# Patient Record
Sex: FEMALE | Race: WHITE | NOT HISPANIC OR LATINO
[De-identification: names, ages, dates, MRNs, and addresses within clinical notes are randomized per-mention and may not be internally consistent; named-entity substitution may affect disease eponyms.]

---

## 2020-09-28 PROBLEM — Z00.00 ENCOUNTER FOR PREVENTIVE HEALTH EXAMINATION: Status: ACTIVE | Noted: 2020-09-28

## 2020-10-02 ENCOUNTER — APPOINTMENT (OUTPATIENT)
Dept: PLASTIC SURGERY | Facility: CLINIC | Age: 70
End: 2020-10-02
Payer: MEDICARE

## 2020-10-02 VITALS — BODY MASS INDEX: 23.92 KG/M2 | WEIGHT: 130 LBS | HEIGHT: 62 IN

## 2020-10-02 DIAGNOSIS — Z78.9 OTHER SPECIFIED HEALTH STATUS: ICD-10-CM

## 2020-10-02 PROCEDURE — 99203 OFFICE O/P NEW LOW 30 MIN: CPT

## 2020-10-02 RX ORDER — FISH OIL/E/FATTY ACID5/HERB137 400 MG-5
CAPSULE ORAL
Refills: 0 | Status: ACTIVE | COMMUNITY

## 2020-10-02 RX ORDER — MENTHOL/CAMPHOR 0.5 %-0.5%
1000 LOTION (ML) TOPICAL
Refills: 0 | Status: ACTIVE | COMMUNITY

## 2020-10-02 RX ORDER — MULTIVIT-MIN/IRON/FOLIC ACID/K 18-600-40
CAPSULE ORAL
Refills: 0 | Status: ACTIVE | COMMUNITY

## 2020-10-02 NOTE — ASSESSMENT
[FreeTextEntry1] : 71 y/o F with newly dx Malignant Melanoma of scalp, at least 1.8mm\par \par as above\par no neck LAD appreciated\par well healing right posterior scalp bx site\par needs:\par -PET scan\par -neck CT w IV contrast\par -excision w STSG and concurrent sentinel lymph node biopsy\par \par Regarding the procedure proposed to excise the melanoma, we discussed the following risks:  Patient is aware of possible upstaging pending the final pathology report, as well as the need for additional surgery and/or other therapeutic interventions.  We discussed scarring, risk of repeat procedure, poor wound healing, bleeding, infection, and dissatisfaction with the outcome.  We also discussed the possible role of adjuvant therapy and other specialists being involved in the care of the patient.  Dermatologic surveillance was emphasized.\par All questions were answered to the patient's satisfaction.\par \par Regarding sentinel lymph node biopsy, we discussed my recommendation to perform the procedure and rationale for it.  Patient is aware the risk of the procedure include possible neurovascular injury with temporary or permanent deficit, lymphatic injury resulting in postoperative seroma, lymphocoele or recurrent fluid collections that may require intervention, additional or formal lymph node dissection depending upon the pathology results, as well as bleeding and infection.  Patient understands the additional incisions that will be required for the procedure.  Patient understands the risks and had their questions answered.\par \par F/U after imaging studies

## 2020-10-02 NOTE — PHYSICAL EXAM
[de-identified] : well-appearing, NAD [de-identified] : right superior scalp with 6x6mm healing biopsy site\par  [de-identified] : no palpable LAD [de-identified] : no palpable LAD

## 2020-10-02 NOTE — HISTORY OF PRESENT ILLNESS
[FreeTextEntry1] : Pt is a 71 y/o F with PMH of multiple BCC who presents for evaluation of newly dx malignant melanoma to scalp, 1.8mm. Pt states it was found on routine dermatological exam. No other testing done yet.\par \par Nonsmoker,\par Nondiabetic\par Retired\par Lives alone

## 2020-10-09 ENCOUNTER — RESULT REVIEW (OUTPATIENT)
Age: 70
End: 2020-10-09

## 2020-10-09 ENCOUNTER — OUTPATIENT (OUTPATIENT)
Dept: OUTPATIENT SERVICES | Facility: HOSPITAL | Age: 70
LOS: 1 days | Discharge: HOME | End: 2020-10-09
Payer: MEDICARE

## 2020-10-09 DIAGNOSIS — C43.4 MALIGNANT MELANOMA OF SCALP AND NECK: ICD-10-CM

## 2020-10-09 LAB — GLUCOSE BLDC GLUCOMTR-MCNC: 93 MG/DL — SIGNIFICANT CHANGE UP (ref 70–99)

## 2020-10-09 PROCEDURE — 78816 PET IMAGE W/CT FULL BODY: CPT | Mod: 26,PI

## 2020-10-13 ENCOUNTER — OUTPATIENT (OUTPATIENT)
Dept: OUTPATIENT SERVICES | Facility: HOSPITAL | Age: 70
LOS: 1 days | Discharge: HOME | End: 2020-10-13
Payer: MEDICARE

## 2020-10-13 ENCOUNTER — RESULT REVIEW (OUTPATIENT)
Age: 70
End: 2020-10-13

## 2020-10-13 VITALS
RESPIRATION RATE: 15 BRPM | SYSTOLIC BLOOD PRESSURE: 143 MMHG | WEIGHT: 133.16 LBS | HEART RATE: 82 BPM | HEIGHT: 62 IN | DIASTOLIC BLOOD PRESSURE: 60 MMHG | TEMPERATURE: 98 F | OXYGEN SATURATION: 98 %

## 2020-10-13 DIAGNOSIS — C43.4 MALIGNANT MELANOMA OF SCALP AND NECK: ICD-10-CM

## 2020-10-13 DIAGNOSIS — Z98.890 OTHER SPECIFIED POSTPROCEDURAL STATES: Chronic | ICD-10-CM

## 2020-10-13 DIAGNOSIS — Z01.818 ENCOUNTER FOR OTHER PREPROCEDURAL EXAMINATION: ICD-10-CM

## 2020-10-13 LAB
ALBUMIN SERPL ELPH-MCNC: 4.6 G/DL — SIGNIFICANT CHANGE UP (ref 3.5–5.2)
ALP SERPL-CCNC: 58 U/L — SIGNIFICANT CHANGE UP (ref 30–115)
ALT FLD-CCNC: 16 U/L — SIGNIFICANT CHANGE UP (ref 0–41)
ANION GAP SERPL CALC-SCNC: 12 MMOL/L — SIGNIFICANT CHANGE UP (ref 7–14)
APTT BLD: 30 SEC — SIGNIFICANT CHANGE UP (ref 27–39.2)
AST SERPL-CCNC: 18 U/L — SIGNIFICANT CHANGE UP (ref 0–41)
BASOPHILS # BLD AUTO: 0.06 K/UL — SIGNIFICANT CHANGE UP (ref 0–0.2)
BASOPHILS NFR BLD AUTO: 0.8 % — SIGNIFICANT CHANGE UP (ref 0–1)
BILIRUB SERPL-MCNC: 0.4 MG/DL — SIGNIFICANT CHANGE UP (ref 0.2–1.2)
BUN SERPL-MCNC: 19 MG/DL — SIGNIFICANT CHANGE UP (ref 10–20)
CALCIUM SERPL-MCNC: 9.5 MG/DL — SIGNIFICANT CHANGE UP (ref 8.5–10.1)
CHLORIDE SERPL-SCNC: 103 MMOL/L — SIGNIFICANT CHANGE UP (ref 98–110)
CO2 SERPL-SCNC: 24 MMOL/L — SIGNIFICANT CHANGE UP (ref 17–32)
CREAT SERPL-MCNC: 0.7 MG/DL — SIGNIFICANT CHANGE UP (ref 0.7–1.5)
EOSINOPHIL # BLD AUTO: 0.33 K/UL — SIGNIFICANT CHANGE UP (ref 0–0.7)
EOSINOPHIL NFR BLD AUTO: 4.2 % — SIGNIFICANT CHANGE UP (ref 0–8)
GLUCOSE SERPL-MCNC: 102 MG/DL — HIGH (ref 70–99)
HCT VFR BLD CALC: 40.4 % — SIGNIFICANT CHANGE UP (ref 37–47)
HGB BLD-MCNC: 13.2 G/DL — SIGNIFICANT CHANGE UP (ref 12–16)
IMM GRANULOCYTES NFR BLD AUTO: 0.1 % — SIGNIFICANT CHANGE UP (ref 0.1–0.3)
INR BLD: 0.9 RATIO — SIGNIFICANT CHANGE UP (ref 0.65–1.3)
LYMPHOCYTES # BLD AUTO: 1.96 K/UL — SIGNIFICANT CHANGE UP (ref 1.2–3.4)
LYMPHOCYTES # BLD AUTO: 25.2 % — SIGNIFICANT CHANGE UP (ref 20.5–51.1)
MCHC RBC-ENTMCNC: 32.7 G/DL — SIGNIFICANT CHANGE UP (ref 32–37)
MCHC RBC-ENTMCNC: 32.7 PG — HIGH (ref 27–31)
MCV RBC AUTO: 100 FL — HIGH (ref 81–99)
MONOCYTES # BLD AUTO: 0.59 K/UL — SIGNIFICANT CHANGE UP (ref 0.1–0.6)
MONOCYTES NFR BLD AUTO: 7.6 % — SIGNIFICANT CHANGE UP (ref 1.7–9.3)
NEUTROPHILS # BLD AUTO: 4.82 K/UL — SIGNIFICANT CHANGE UP (ref 1.4–6.5)
NEUTROPHILS NFR BLD AUTO: 62.1 % — SIGNIFICANT CHANGE UP (ref 42.2–75.2)
NRBC # BLD: 0 /100 WBCS — SIGNIFICANT CHANGE UP (ref 0–0)
PLATELET # BLD AUTO: 219 K/UL — SIGNIFICANT CHANGE UP (ref 130–400)
POTASSIUM SERPL-MCNC: 4.1 MMOL/L — SIGNIFICANT CHANGE UP (ref 3.5–5)
POTASSIUM SERPL-SCNC: 4.1 MMOL/L — SIGNIFICANT CHANGE UP (ref 3.5–5)
PROT SERPL-MCNC: 6.9 G/DL — SIGNIFICANT CHANGE UP (ref 6–8)
PROTHROM AB SERPL-ACNC: 10.4 SEC — SIGNIFICANT CHANGE UP (ref 9.95–12.87)
RBC # BLD: 4.04 M/UL — LOW (ref 4.2–5.4)
RBC # FLD: 11.9 % — SIGNIFICANT CHANGE UP (ref 11.5–14.5)
SODIUM SERPL-SCNC: 139 MMOL/L — SIGNIFICANT CHANGE UP (ref 135–146)
WBC # BLD: 7.77 K/UL — SIGNIFICANT CHANGE UP (ref 4.8–10.8)
WBC # FLD AUTO: 7.77 K/UL — SIGNIFICANT CHANGE UP (ref 4.8–10.8)

## 2020-10-13 PROCEDURE — 93010 ELECTROCARDIOGRAM REPORT: CPT | Mod: NC

## 2020-10-13 PROCEDURE — 71046 X-RAY EXAM CHEST 2 VIEWS: CPT | Mod: 26

## 2020-10-13 NOTE — H&P PST ADULT - RS GEN PE MLT RESP DETAILS PC
airway patent/no chest wall tenderness/breath sounds equal/good air movement/normal/respirations non-labored/clear to auscultation bilaterally

## 2020-10-13 NOTE — H&P PST ADULT - REASON FOR ADMISSION
PT PRESENTS TO PAST WITH NO SOB, CP, PALPITATIONS, DYSURIA, UTI OR URI AT PRESENT.   PT ABLE TO WALK UP 2-3 FLIGHTS OF STEPS WITH NO SOB.  AS PER THE PT, THIS IS HIS/HER COMPLETE MEDICAL AND SURGICAL HX, INCLUDING MEDICATIONS PRESCRIBED AND OVER THE COUNTER  pt denies any covid s/s, or tested positive in the past  pt advised self quarantine till day of procedure  denies travel outside the [place] in the past 30 days  Anesthesia Alert  NO--Difficult Airway  NO--History of neck surgery or radiation  NO--Limited ROM of neck  NO--History of Malignant hyperthermia  NO--Personal or family history of Pseudocholinesterase deficiency  NO--Prior Anesthesia Complication  NO--Latex Allergy  NO--Loose teeth  NO--History of Rheumatoid Arthritis  NO--ODALIS  NO--Other_____  PT SCHEDULED FOR EXCISION OF SCALP MELANOMA SENTINEL LYMPH NODE BIOPSY SPLIT THICKNESS SKIN GRAFT TO SCALP DONOR SITE BACK OR B/L THIGH.

## 2020-10-15 PROBLEM — C44.90 UNSPECIFIED MALIGNANT NEOPLASM OF SKIN, UNSPECIFIED: Chronic | Status: ACTIVE | Noted: 2020-10-13

## 2020-10-17 ENCOUNTER — RESULT REVIEW (OUTPATIENT)
Age: 70
End: 2020-10-17

## 2020-10-17 ENCOUNTER — OUTPATIENT (OUTPATIENT)
Dept: OUTPATIENT SERVICES | Facility: HOSPITAL | Age: 70
LOS: 1 days | Discharge: HOME | End: 2020-10-17
Payer: MEDICARE

## 2020-10-17 DIAGNOSIS — C43.4 MALIGNANT MELANOMA OF SCALP AND NECK: ICD-10-CM

## 2020-10-17 DIAGNOSIS — Z98.890 OTHER SPECIFIED POSTPROCEDURAL STATES: Chronic | ICD-10-CM

## 2020-10-17 PROCEDURE — 70491 CT SOFT TISSUE NECK W/DYE: CPT | Mod: 26

## 2020-10-18 ENCOUNTER — LABORATORY RESULT (OUTPATIENT)
Age: 70
End: 2020-10-18

## 2020-10-18 ENCOUNTER — OUTPATIENT (OUTPATIENT)
Dept: OUTPATIENT SERVICES | Facility: HOSPITAL | Age: 70
LOS: 1 days | Discharge: HOME | End: 2020-10-18

## 2020-10-18 DIAGNOSIS — Z98.890 OTHER SPECIFIED POSTPROCEDURAL STATES: Chronic | ICD-10-CM

## 2020-10-19 DIAGNOSIS — Z11.59 ENCOUNTER FOR SCREENING FOR OTHER VIRAL DISEASES: ICD-10-CM

## 2020-10-21 ENCOUNTER — RESULT REVIEW (OUTPATIENT)
Age: 70
End: 2020-10-21

## 2020-10-21 ENCOUNTER — OUTPATIENT (OUTPATIENT)
Dept: OUTPATIENT SERVICES | Facility: HOSPITAL | Age: 70
LOS: 1 days | Discharge: HOME | End: 2020-10-21
Payer: MEDICARE

## 2020-10-21 ENCOUNTER — APPOINTMENT (OUTPATIENT)
Dept: PLASTIC SURGERY | Facility: AMBULATORY SURGERY CENTER | Age: 70
End: 2020-10-21
Payer: MEDICARE

## 2020-10-21 VITALS
OXYGEN SATURATION: 100 % | HEART RATE: 72 BPM | TEMPERATURE: 98 F | DIASTOLIC BLOOD PRESSURE: 61 MMHG | SYSTOLIC BLOOD PRESSURE: 119 MMHG | HEIGHT: 62 IN | WEIGHT: 133.16 LBS | RESPIRATION RATE: 17 BRPM

## 2020-10-21 VITALS — SYSTOLIC BLOOD PRESSURE: 134 MMHG | HEART RATE: 90 BPM | OXYGEN SATURATION: 97 % | DIASTOLIC BLOOD PRESSURE: 60 MMHG

## 2020-10-21 DIAGNOSIS — C43.4 MALIGNANT MELANOMA OF SCALP AND NECK: ICD-10-CM

## 2020-10-21 DIAGNOSIS — Z98.890 OTHER SPECIFIED POSTPROCEDURAL STATES: Chronic | ICD-10-CM

## 2020-10-21 PROCEDURE — 38510 BIOPSY/REMOVAL LYMPH NODES: CPT

## 2020-10-21 PROCEDURE — 88305 TISSUE EXAM BY PATHOLOGIST: CPT | Mod: 26

## 2020-10-21 PROCEDURE — 88307 TISSUE EXAM BY PATHOLOGIST: CPT | Mod: 26

## 2020-10-21 PROCEDURE — 88342 IMHCHEM/IMCYTCHM 1ST ANTB: CPT | Mod: 26

## 2020-10-21 PROCEDURE — 11626 EXC S/N/H/F/G MAL+MRG >4 CM: CPT | Mod: 59

## 2020-10-21 PROCEDURE — 78195 LYMPH SYSTEM IMAGING: CPT | Mod: 26

## 2020-10-21 PROCEDURE — 15120 SPLT AGRFT F/S/N/H/F/G/M 1ST: CPT

## 2020-10-21 PROCEDURE — 88341 IMHCHEM/IMCYTCHM EA ADD ANTB: CPT | Mod: 26

## 2020-10-21 PROCEDURE — 88313 SPECIAL STAINS GROUP 2: CPT | Mod: 26

## 2020-10-21 PROCEDURE — 15004 WOUND PREP F/N/HF/G: CPT

## 2020-10-21 RX ORDER — MORPHINE SULFATE 50 MG/1
2 CAPSULE, EXTENDED RELEASE ORAL
Refills: 0 | Status: DISCONTINUED | OUTPATIENT
Start: 2020-10-21 | End: 2020-10-21

## 2020-10-21 RX ORDER — TRAMADOL HYDROCHLORIDE 50 MG/1
1 TABLET ORAL
Qty: 10 | Refills: 0
Start: 2020-10-21

## 2020-10-21 RX ORDER — OXYCODONE AND ACETAMINOPHEN 5; 325 MG/1; MG/1
1 TABLET ORAL EVERY 4 HOURS
Refills: 0 | Status: DISCONTINUED | OUTPATIENT
Start: 2020-10-21 | End: 2020-10-21

## 2020-10-21 RX ORDER — CEPHALEXIN 500 MG
1 CAPSULE ORAL
Qty: 20 | Refills: 0
Start: 2020-10-21 | End: 2020-10-25

## 2020-10-21 RX ORDER — ONDANSETRON 8 MG/1
4 TABLET, FILM COATED ORAL ONCE
Refills: 0 | Status: DISCONTINUED | OUTPATIENT
Start: 2020-10-21 | End: 2020-11-04

## 2020-10-21 RX ORDER — SODIUM CHLORIDE 9 MG/ML
1000 INJECTION, SOLUTION INTRAVENOUS
Refills: 0 | Status: DISCONTINUED | OUTPATIENT
Start: 2020-10-21 | End: 2020-11-04

## 2020-10-21 RX ADMIN — SODIUM CHLORIDE 100 MILLILITER(S): 9 INJECTION, SOLUTION INTRAVENOUS at 16:06

## 2020-10-21 NOTE — BRIEF OPERATIVE NOTE - OPERATION/FINDINGS
Wide local excision of right scalp >/= 1.8mm malignant melanoma  Application of STSG to right scalp from right thigh  [place] LN biopsy of right neck, readin

## 2020-10-21 NOTE — BRIEF OPERATIVE NOTE - NSICDXBRIEFPROCEDURE_GEN_ALL_CORE_FT
PROCEDURES:  Excision, melanoma, head, with sentinel lymph node biopsy 21-Oct-2020 16:01:26 right scalp melanoma, right neck SLNBx, with STSG to right scalp from right thigh Rosangela Dunaway

## 2020-10-21 NOTE — ASU DISCHARGE PLAN (ADULT/PEDIATRIC) - ASU DC SPECIAL INSTRUCTIONSFT
-Keep dressing clean and dry. Do not remove.  -Take antibiotics for 5 days as prescribed.  -Take Tylenol as needed for pain. If not well controlled, take Tramadol as needed.  -Sleep with the head of the bed elevated using pillows to help prevent swelling.  -If any drainge from right thigh dressing, please reinfoce with more gauze. Do not remove.  -No driving if taking pain medication.  -Call the office anytime for questions or concerns: [phone].  -Follow up in 1 week.

## 2020-10-21 NOTE — CHART NOTE - NSCHARTNOTEFT_GEN_A_CORE
PACU ANESTHESIA ADMISSION NOTE      Procedure: Excision, melanoma, head, with sentinel lymph node biopsy  right scalp melanoma, right neck SLNBx, with STSG to right scalp from right thigh      Post op diagnosis:  Melanoma of scalp        ____  Intubated  TV:______       Rate: ______      FiO2: ______    __x__  Patent Airway    __x__  Full return of protective reflexes    __x__  Full recovery from anesthesia / back to baseline status    Vitals  HR: 89  BP: 153/77  RR: 18  O2 Sat: 99  Temp: 97    Mental Status:  __x__ Awake   ___x__ Alert   _____ Drowsy   _____ Sedated    Nausea/Vomiting:  __x__ NO  ______Yes,   See Post - Op Orders          Pain Scale (0-10):  _____    Treatment: ____ None    __x__ See Post - Op/PCA Orders    Post - Operative Fluids:   ____ Oral   __x__ See Post - Op Orders    Plan: Discharge when criteria met:   _x___Home       _____Floor     _____Critical Care   Other:_________________    Comments: Patient had smooth intraoperative event, no anesthesia complication.

## 2020-10-21 NOTE — ASU DISCHARGE PLAN (ADULT/PEDIATRIC) - CARE PROVIDER_API CALL
Aidan Reynolds  PLASTIC SURGERY  [address] [place] [address], [address]  [place] [address]  Phone: [phone]  Fax: [phone]  Follow Up Time:

## 2020-10-23 ENCOUNTER — NON-APPOINTMENT (OUTPATIENT)
Age: 70
End: 2020-10-23

## 2020-10-28 ENCOUNTER — APPOINTMENT (OUTPATIENT)
Dept: PLASTIC SURGERY | Facility: CLINIC | Age: 70
End: 2020-10-28
Payer: MEDICARE

## 2020-10-28 ENCOUNTER — LABORATORY RESULT (OUTPATIENT)
Age: 70
End: 2020-10-28

## 2020-10-28 PROCEDURE — 99024 POSTOP FOLLOW-UP VISIT: CPT

## 2020-10-28 NOTE — HISTORY OF PRESENT ILLNESS
[FreeTextEntry1] : Pt is a 69 y/o F with PMH of multiple BCC who presents for evaluation of newly dx malignant melanoma to scalp, 1.8mm. Pt states it was found on routine dermatological exam. No other testing done yet.\par \par Nonsmoker,\par Nondiabetic\par Retired\par Lives alone\par \par Interval hx (10/28/20): Pt presents today POD #7 s/p WLE of right scalp melanoma with SLNx and coverage with STSG from right thigh. Denies pain, taking only Tylenol PRN. Completed PO abx. Denies f/c.

## 2020-10-28 NOTE — ASSESSMENT
[FreeTextEntry1] : 71 y/o F with newly dx Malignant Melanoma of scalp, at least 1.8mm\par Now POD #7 s/p WLE of right scalp melanoma with SLNx and coverage with STSG from right thigh, doing well with 100% take\par \par -Pathology pending\par -Dressings changed\par -Keep all surgical sites clean and dry\par -F/u 1 week for staple removal

## 2020-10-28 NOTE — PHYSICAL EXAM
[de-identified] : well-appearing, NAD [de-identified] : right superior scalp with healing skin graft, completely adherent, staples c/d/i, no s/s cellulitis [de-identified] : no palpable LAD [de-identified] : right neck dressing c/d/i, nontender, no palpable seroma/hematoma [de-identified] : right thigh donor site healing apppropriately, minimal SS drainage

## 2020-11-01 ENCOUNTER — TRANSCRIPTION ENCOUNTER (OUTPATIENT)
Age: 70
End: 2020-11-01

## 2020-11-04 ENCOUNTER — APPOINTMENT (OUTPATIENT)
Dept: PLASTIC SURGERY | Facility: CLINIC | Age: 70
End: 2020-11-04
Payer: MEDICARE

## 2020-11-04 LAB — SURGICAL PATHOLOGY STUDY: SIGNIFICANT CHANGE UP

## 2020-11-04 PROCEDURE — 99024 POSTOP FOLLOW-UP VISIT: CPT

## 2020-11-04 NOTE — PHYSICAL EXAM
[de-identified] : well-appearing, NAD [de-identified] : right superior scalp with healing skin graft, completely adherent and well incorporated with 100% take, staples c/d/i, no s/s cellulitis or fluid collection  [de-identified] : no palpable LAD [de-identified] : right posterior neck incision c/d/i, nontender, no palpable seroma/hematoma [de-identified] : right thigh donor site stable and epithelializing, no active drainage

## 2020-11-04 NOTE — ASSESSMENT
[FreeTextEntry1] : 69 y/o F with newly dx Malignant Melanoma of scalp, at least 1.8mm.\par Now POD #14 s/p WLE of right scalp melanoma with SLN bx and coverage with STSG from right thigh, doing well.\par \par -Staples removed \par -Daily Aquaphor\par -Pathology pending\par -Post-op instructions discussed and all questions answered\par -May shower \par -F/u 2 weeks for wound check and pathology

## 2020-11-04 NOTE — HISTORY OF PRESENT ILLNESS
[FreeTextEntry1] : 71 y/o F with PMH of multiple BCC who presents for evaluation of newly dx malignant melanoma to scalp, 1.8mm. Pt states it was found on routine dermatological exam. No other testing done yet.\par \par Nonsmoker,\par Nondiabetic\par Retired\par Lives alone\par \par Interval hx (10/28/20): Pt presents today POD #7 s/p WLE of right scalp melanoma with SLNx and coverage with STSG from right thigh. Denies pain, taking only Tylenol PRN. Completed PO abx. Denies f/c.\par \par Interval hx (11/4/20). Patient presents today POD #14 s/p WLE of right scalp melanoma with SLN bx and coverage with STSG from right thigh. Doing well with resolving discomfort. Denies any fever, chills or bleeding.

## 2020-11-09 ENCOUNTER — NON-APPOINTMENT (OUTPATIENT)
Age: 70
End: 2020-11-09

## 2020-11-17 ENCOUNTER — APPOINTMENT (OUTPATIENT)
Dept: PLASTIC SURGERY | Facility: CLINIC | Age: 70
End: 2020-11-17
Payer: MEDICARE

## 2020-11-17 PROCEDURE — 99024 POSTOP FOLLOW-UP VISIT: CPT

## 2020-11-17 NOTE — PHYSICAL EXAM
[de-identified] : well-appearing, NAD [de-identified] : right superior scalp with maturing skin graft completely adherent and well incorporated with 100% take, periwound clean  [de-identified] : no palpable LAD [de-identified] : right posterior neck incision c/d/i, nontender, no palpable seroma/hematoma [de-identified] : right thigh donor site stable and epithelializing

## 2020-11-17 NOTE — ASSESSMENT
[FreeTextEntry1] : 69 y/o F with newly dx Malignant Melanoma of scalp, at least 1.8mm.\par Now 4 weeks s/p WLE of right scalp melanoma with SLN bx and coverage with STSG from right thigh, doing well.\par \par -Daily Aquaphor\par -Pathology discussed \par -Post-op instructions discussed and all questions answered\par -Dermatologic surveillance\par -F/U 4 months  \par \par as above\par doing very well\par no issues--path clear margins and LN negative\par \par f/u 4months

## 2020-11-17 NOTE — HISTORY OF PRESENT ILLNESS
[FreeTextEntry1] : 69 y/o F with PMH of multiple BCC who presents for evaluation of newly dx malignant melanoma to scalp, 1.8mm. Pt states it was found on routine dermatological exam. No other testing done yet.\par \par Nonsmoker,\par Nondiabetic\par Retired\par Lives alone\par \par Interval hx (10/28/20): Pt presents today POD #7 s/p WLE of right scalp melanoma with SLNx and coverage with STSG from right thigh. Denies pain, taking only Tylenol PRN. Completed PO abx. Denies f/c.\par \par Interval hx (11/4/20). Patient presents today POD #14 s/p WLE of right scalp melanoma with SLN bx and coverage with STSG from right thigh. Doing well with resolving discomfort. Denies any fever, chills or bleeding. \par \par Interval hx (11/17/20).  Patient presents today 4 weeks s/p WLE of right scalp melanoma with SLN bx and coverage with STSG from right thigh. Doing well with no significant complaints.

## 2020-11-17 NOTE — DATA REVIEWED
[FreeTextEntry1] :  Pathology             Final\par \par No Documents Attached\par \par \par \par   [facility] Accession Number : [id number]\par \par LILO PHILLIPS                           3\par \par \par \par Surgical Final Report\par \par \par \par \par Final Diagnosis\par 1. Right scalp melanoma, excision:\par - In-situ and invasive melanoma, completely excised.\par - Invasive melanoma thickness is 0.48 mm (Breslow).\par - Closest resection margin for in-situ melanoma is 1.6 cm (6:00\par margin).\par - Closest resection margin for invasive melanoma is 1.8 cm from\par the margin (6:00 margin).\par - Chronic inflammation, solar elastosis and focal fibrosis.\par - Immunohistochemical stain MART1/melan A highlights\par melanocytes.\par \par 2. Right neck submandibular lymph node (sentinel lymph node):\par - One lymph node negative for metastasis.\par - Immunohistochemical stain MART-1 / Melan A is negative.\par note: In the original biopsy report the depth of invasion is 1.8\par mm.\par \par Verified by: Arleth Quesada M.D.\par (Electronic Signature)\par Reported on: 11/04/20 17:28 EST, [facility] [address]\par Phone: [phone]   Fax: [phone]\par _________________________________________________________________\par \par Comment\par Case reported to Tumor Registry.\par Note: This case was reviewed in intradepartmental QA conference\par and the diagnosis represents a consensus opinion.\par \par Synoptic Summary\par SURGICAL PATHOLOGY CANCER CASE  SUMMARY\par \par MELANOMA OF THE SKIN:  Excision, Re-excision\par \par PROCEDURE: Excision\par SPECIMEN LATERALITY:  Right\par MACROSCOPIC SATELLITE NODULE: Not identified\par HISTOLOGIC TYPE\par INVASIVE MELANOMA:\par Melanoma, not otherwise classified\par MELANOMA IN-SITU (anatomic level 1)\par Melanoma in-situ, not otherwise classified\par MAXIMUM TUMOR (Breslow) THICKNESS:  0.48 mm\par ULCERATION:  Not identified\par MICROSATELLITE:  Not identified\par PERIPHERAL MARGINS:  Negative for invasive melanoma\par \par \par \par \par \par \par JACQUELINE, LILO                           3\par \par \par \par Surgical Final Report\par \par \par \par \par Distance of invasive melanoma from closest peripheral margin:15\par mm from 6 o'clock margin\par DEEP MARGIN:  Negative for invasive melanoma\par LYMPHOVASCULAR INVASION:  Not identified\par NEUROTROPISM:  Not identified\par TUMOR-INFILTRATING LYMPHOCYTES:  Present, nonbrisk\par TUMOR REGRESSION:  Not identified\par REGIONAL LYMPH  NODES:  Uninvolved by tumor cells\par TOTAL NUMBER OF LYMPH NODES EXAMINED:  1\par NUMBEROF SENTINEL NODES EXAMINED:  1\par PATHOLOGIC STAGE\par TNM DESCRIPTORS:\par PRIMARY TUMOR (pT):  pT1a\par REGIONAL LYMPH NODES (pN):  pN0\par \par Clinical History\par See report melanoma 1.8 mm right scalp - slides pending\par COVID (-)\par \par Specimen(s) Submitted\par 1     Right scalp melanoma\par 2     Right neck submandibular lymph node\par \par Gross Description\par 1. The specimen is received fresh, labeled "right scalp melanoma\par stitch marks 12 o'clock" and consists of one fragment of disc\par shaped skin and subcutaneous tissue with a stitch rich at 12\par o'clock, measuring 5.5 x 4.5 cm and 0.3 cm in thickness. The skin\par is tan white and wrinkled. There is a central, slightly depressed\par lesion with irregular margins, measuring 1.5 x 1.3 cm. The lesion\par is 1.7 cm from the 12 o'clock margin, 2 cm from the 3 o'clock\par margin, 1.5 cm from the 6 o'clock margin, 1.7 cm from the 9\par o'clock margin. The specimen is inked as follows: 12-3 o'clock -\par red, 3-6 o'clock - green, 6-9 o'clock - yellow, 9-12 o'clock -\par blue. The lesion is blocked out (1.7 x 1.7 cm) and serially\par sectioned from superior to inferior.  Representative sections are\par submitted.\par \par Summary of Sections:\par 1A -12-3 o'clock margin -1\par 1B - 3-6 o'clock margin -1\par 1C - 6-9 o'clock margin -1\par 1D - 9-12 o'clock margin -1\par 1E-1F -\par - Lesion sectioned superior to inferior -2\par \par Total blocks - 6\par \par \par \par \par \par \par \par URBAN, LILO                           3\par \par \par \par Surgical Final Report\par \par \par \par \par 2. The specimen is received fresh, labeled "right neck\par submandibular lymph node, sentinel lymph node" and consists of\par one soft tan red lymph node, measuring 1.2 x 0.6 x 0.4 cm. The\par specimen is serially sectioned, revealing a gray shiny smooth\par surface. The specimen is submitted entirely. (1 block)\par \par Specimen was received and underwent gross examination at [facility], [address] [place] [address],\[place] [address].\par \par 10/22/20 07:09 rr\par \par Perioperative Diagnosis\par Right scalp melanoma 1.8 mm (see report slides pending)\par \par  \par \par  Ordered by: BERNARDO DUMONT IV       Collected/Examined: 21Oct2020 03:00PM       \par Verification Required       Stage: Final       \par  Performed at: [facility]       Resulted: 04Nov2020 05:28PM       Last Updated: 04Nov2020 05:28PM       Accession: [id number]

## 2021-02-05 ENCOUNTER — NON-APPOINTMENT (OUTPATIENT)
Age: 71
End: 2021-02-05

## 2021-02-05 LAB
ANION GAP SERPL CALC-SCNC: 12 MMOL/L
BUN SERPL-MCNC: 15 MG/DL
CALCIUM SERPL-MCNC: 9.7 MG/DL
CHLORIDE SERPL-SCNC: 102 MMOL/L
CO2 SERPL-SCNC: 25 MMOL/L
CREAT SERPL-MCNC: 0.7 MG/DL
GLUCOSE SERPL-MCNC: 112 MG/DL
POTASSIUM SERPL-SCNC: 4.3 MMOL/L
SODIUM SERPL-SCNC: 139 MMOL/L

## 2021-03-09 ENCOUNTER — APPOINTMENT (OUTPATIENT)
Dept: PLASTIC SURGERY | Facility: CLINIC | Age: 71
End: 2021-03-09
Payer: MEDICARE

## 2021-03-09 PROCEDURE — 99212 OFFICE O/P EST SF 10 MIN: CPT

## 2021-03-09 NOTE — ASSESSMENT
[FreeTextEntry1] : 71 y/o F with newly dx Malignant Melanoma of scalp, at least 1.8mm.\par Now 5 months s/p WLE of right scalp melanoma with SLN bx and coverage with STSG from right thigh, doing well.\par \par -Daily Aquaphor\par -Dermatologic surveillance\par -F/U 4 months  \par \par

## 2021-03-09 NOTE — HISTORY OF PRESENT ILLNESS
[FreeTextEntry1] : 69 y/o F with PMH of multiple BCC who presents for evaluation of newly dx malignant melanoma to scalp, 1.8mm. Pt states it was found on routine dermatological exam. No other testing done yet.\par \par Nonsmoker,\par Nondiabetic\par Retired\par Lives alone\par \par Interval hx (10/28/20): Pt presents today POD #7 s/p WLE of right scalp melanoma with SLNx and coverage with STSG from right thigh. Denies pain, taking only Tylenol PRN. Completed PO abx. Denies f/c.\par \par Interval hx (11/4/20). Patient presents today POD #14 s/p WLE of right scalp melanoma with SLN bx and coverage with STSG from right thigh. Doing well with resolving discomfort. Denies any fever, chills or bleeding. \par \par Interval hx (11/17/20).  Patient presents today 4 weeks s/p WLE of right scalp melanoma with SLN bx and coverage with STSG from right thigh. Doing well with no significant complaints. \par \par Interval hx (3/9/21).  Patient presents today 5 months s/p WLE of right scalp melanoma with SLN bx and coverage with STSG from right thigh. Doing well.

## 2021-03-09 NOTE — PHYSICAL EXAM
[de-identified] : well-appearing, NAD [de-identified] : right superior scalp with mature skin graft  [de-identified] : no palpable LAD [de-identified] : right posterior neck incision healed  [de-identified] : right thigh donor site stable and epithelializing

## 2021-03-09 NOTE — DATA REVIEWED
[FreeTextEntry1] :  Pathology             Final\par \par No Documents Attached\par \par \par \par   [facility] Accession Number : [id number]\par \par LILO PHILLIPS                           3\par \par \par \par Surgical Final Report\par \par \par \par \par Final Diagnosis\par 1. Right scalp melanoma, excision:\par - In-situ and invasive melanoma, completely excised.\par - Invasive melanoma thickness is 0.48 mm (Breslow).\par - Closest resection margin for in-situ melanoma is 1.6 cm (6:00\par margin).\par - Closest resection margin for invasive melanoma is 1.8 cm from\par the margin (6:00 margin).\par - Chronic inflammation, solar elastosis and focal fibrosis.\par - Immunohistochemical stain MART1/melan A highlights\par melanocytes.\par \par 2. Right neck submandibular lymph node (sentinel lymph node):\par - One lymph node negative for metastasis.\par - Immunohistochemical stain MART-1 / Melan A is negative.\par note: In the original biopsy report the depth of invasion is 1.8\par mm.\par \par Verified by: Arleth Quesada M.D.\par (Electronic Signature)\par Reported on: 11/04/20 17:28 EST, [address] [facility] [address]\par Phone: [phone]   Fax: [phone]\par _________________________________________________________________\par \par Comment\par Case reported to Tumor Registry.\par Note: This case was reviewed in intradepartmental QA conference\par and the diagnosis represents a consensus opinion.\par \par Synoptic Summary\par SURGICAL PATHOLOGY CANCER CASE  SUMMARY\par \par MELANOMA OF THE SKIN:  Excision, Re-excision\par \par PROCEDURE: Excision\par SPECIMEN LATERALITY:  Right\par MACROSCOPIC SATELLITE NODULE: Not identified\par HISTOLOGIC TYPE\par INVASIVE MELANOMA:\par Melanoma, not otherwise classified\par MELANOMA IN-SITU (anatomic level 1)\par Melanoma in-situ, not otherwise classified\par MAXIMUM TUMOR (Breslow) THICKNESS:  0.48 mm\par ULCERATION:  Not identified\par MICROSATELLITE:  Not identified\par PERIPHERAL MARGINS:  Negative for invasive melanoma\par \par \par \par \par \par \par JACQUELINE, LILO                           3\par \par \par \par Surgical Final Report\par \par \par \par \par Distance of invasive melanoma from closest peripheral margin:15\par mm from 6 o'clock margin\par DEEP MARGIN:  Negative for invasive melanoma\par LYMPHOVASCULAR INVASION:  Not identified\par NEUROTROPISM:  Not identified\par TUMOR-INFILTRATING LYMPHOCYTES:  Present, nonbrisk\par TUMOR REGRESSION:  Not identified\par REGIONAL LYMPH  NODES:  Uninvolved by tumor cells\par TOTAL NUMBER OF LYMPH NODES EXAMINED:  1\par NUMBEROF SENTINEL NODES EXAMINED:  1\par PATHOLOGIC STAGE\par TNM DESCRIPTORS:\par PRIMARY TUMOR (pT):  pT1a\par REGIONAL LYMPH NODES (pN):  pN0\par \par Clinical History\par See report melanoma 1.8 mm right scalp - slides pending\par COVID (-)\par \par Specimen(s) Submitted\par 1     Right scalp melanoma\par 2     Right neck submandibular lymph node\par \par Gross Description\par 1. The specimen is received fresh, labeled "right scalp melanoma\par stitch marks 12 o'clock" and consists of one fragment of disc\par shaped skin and subcutaneous tissue with a stitch rich at 12\par o'clock, measuring 5.5 x 4.5 cm and 0.3 cm in thickness. The skin\par is tan white and wrinkled. There is a central, slightly depressed\par lesion with irregular margins, measuring 1.5 x 1.3 cm. The lesion\par is 1.7 cm from the 12 o'clock margin, 2 cm from the 3 o'clock\par margin, 1.5 cm from the 6 o'clock margin, 1.7 cm from the 9\par o'clock margin. The specimen is inked as follows: 12-3 o'clock -\par red, 3-6 o'clock - green, 6-9 o'clock - yellow, 9-12 o'clock -\par blue. The lesion is blocked out (1.7 x 1.7 cm) and serially\par sectioned from superior to inferior.  Representative sections are\par submitted.\par \par Summary of Sections:\par 1A -12-3 o'clock margin -1\par 1B - 3-6 o'clock margin -1\par 1C - 6-9 o'clock margin -1\par 1D - 9-12 o'clock margin -1\par 1E-1F -\par - Lesion sectioned superior to inferior -2\par \par Total blocks - 6\par \par \par \par \par \par \par \par URBAN, LILO                           3\par \par \par \par Surgical Final Report\par \par \par \par \par 2. The specimen is received fresh, labeled "right neck\par submandibular lymph node, sentinel lymph node" and consists of\par one soft tan red lymph node, measuring 1.2 x 0.6 x 0.4 cm. The\par specimen is serially sectioned, revealing a gray shiny smooth\par surface. The specimen is submitted entirely. (1 block)\par \par Specimen was received and underwent gross examination at [facility], [address] [place] [address],\[place] [address].\par \par 10/22/20 07:09 rr\par \par Perioperative Diagnosis\par Right scalp melanoma 1.8 mm (see report slides pending)\par \par  \par \par  Ordered by: BERNARDO DUMONT IV       Collected/Examined: 21Oct2020 03:00PM       \par Verification Required       Stage: Final       \par  Performed at: [facility]       Resulted: 04Nov2020 05:28PM       Last Updated: 04Nov2020 05:28PM       Accession: [id number]

## 2021-05-31 NOTE — H&P PST ADULT - ADMIT DATE
13-Oct-2020 Received a faxed INR result for Buck Sinclair  From [facility]  INR result dated 8/20/2019 is 2.4

## 2021-10-21 ENCOUNTER — APPOINTMENT (OUTPATIENT)
Dept: PLASTIC SURGERY | Facility: CLINIC | Age: 71
End: 2021-10-21
Payer: MEDICARE

## 2021-10-21 PROCEDURE — 99212 OFFICE O/P EST SF 10 MIN: CPT

## 2021-10-21 NOTE — PHYSICAL EXAM
[de-identified] : well-appearing, NAD [de-identified] : right superior scalp with mature skin graft  [de-identified] : no palpable LAD [de-identified] : right posterior neck incision healed  [de-identified] : right thigh donor site stable and epithelializing

## 2021-10-21 NOTE — DATA REVIEWED
[FreeTextEntry1] :  Pathology             Final\par \par No Documents Attached\par \par \par \par   [facility] Accession Number : [id number]\par \par LILO PHILLIPS                           3\par \par \par \par Surgical Final Report\par \par \par \par \par Final Diagnosis\par 1. Right scalp melanoma, excision:\par - In-situ and invasive melanoma, completely excised.\par - Invasive melanoma thickness is 0.48 mm (Breslow).\par - Closest resection margin for in-situ melanoma is 1.6 cm (6:00\par margin).\par - Closest resection margin for invasive melanoma is 1.8 cm from\par the margin (6:00 margin).\par - Chronic inflammation, solar elastosis and focal fibrosis.\par - Immunohistochemical stain MART1/melan A highlights\par melanocytes.\par \par 2. Right neck submandibular lymph node (sentinel lymph node):\par - One lymph node negative for metastasis.\par - Immunohistochemical stain MART-1 / Melan A is negative.\par note: In the original biopsy report the depth of invasion is 1.8\par mm.\par \par Verified by: Arleth Quesada M.D.\par (Electronic Signature)\par Reported on: 11/04/20 17:28 EST, [address] [facility] [address]\par Phone: [phone]   Fax: [phone]\par _________________________________________________________________\par \par Comment\par Case reported to Tumor Registry.\par Note: This case was reviewed in intradepartmental QA conference\par and the diagnosis represents a consensus opinion.\par \par Synoptic Summary\par SURGICAL PATHOLOGY CANCER CASE  SUMMARY\par \par MELANOMA OF THE SKIN:  Excision, Re-excision\par \par PROCEDURE: Excision\par SPECIMEN LATERALITY:  Right\par MACROSCOPIC SATELLITE NODULE: Not identified\par HISTOLOGIC TYPE\par INVASIVE MELANOMA:\par Melanoma, not otherwise classified\par MELANOMA IN-SITU (anatomic level 1)\par Melanoma in-situ, not otherwise classified\par MAXIMUM TUMOR (Breslow) THICKNESS:  0.48 mm\par ULCERATION:  Not identified\par MICROSATELLITE:  Not identified\par PERIPHERAL MARGINS:  Negative for invasive melanoma\par \par \par \par \par \par \par JACQUELINE, LILO                           3\par \par \par \par Surgical Final Report\par \par \par \par \par Distance of invasive melanoma from closest peripheral margin:15\par mm from 6 o'clock margin\par DEEP MARGIN:  Negative for invasive melanoma\par LYMPHOVASCULAR INVASION:  Not identified\par NEUROTROPISM:  Not identified\par TUMOR-INFILTRATING LYMPHOCYTES:  Present, nonbrisk\par TUMOR REGRESSION:  Not identified\par REGIONAL LYMPH  NODES:  Uninvolved by tumor cells\par TOTAL NUMBER OF LYMPH NODES EXAMINED:  1\par NUMBEROF SENTINEL NODES EXAMINED:  1\par PATHOLOGIC STAGE\par TNM DESCRIPTORS:\par PRIMARY TUMOR (pT):  pT1a\par REGIONAL LYMPH NODES (pN):  pN0\par \par Clinical History\par See report melanoma 1.8 mm right scalp - slides pending\par COVID (-)\par \par Specimen(s) Submitted\par 1     Right scalp melanoma\par 2     Right neck submandibular lymph node\par \par Gross Description\par 1. The specimen is received fresh, labeled "right scalp melanoma\par stitch marks 12 o'clock" and consists of one fragment of disc\par shaped skin and subcutaneous tissue with a stitch rich at 12\par o'clock, measuring 5.5 x 4.5 cm and 0.3 cm in thickness. The skin\par is tan white and wrinkled. There is a central, slightly depressed\par lesion with irregular margins, measuring 1.5 x 1.3 cm. The lesion\par is 1.7 cm from the 12 o'clock margin, 2 cm from the 3 o'clock\par margin, 1.5 cm from the 6 o'clock margin, 1.7 cm from the 9\par o'clock margin. The specimen is inked as follows: 12-3 o'clock -\par red, 3-6 o'clock - green, 6-9 o'clock - yellow, 9-12 o'clock -\par blue. The lesion is blocked out (1.7 x 1.7 cm) and serially\par sectioned from superior to inferior.  Representative sections are\par submitted.\par \par Summary of Sections:\par 1A -12-3 o'clock margin -1\par 1B - 3-6 o'clock margin -1\par 1C - 6-9 o'clock margin -1\par 1D - 9-12 o'clock margin -1\par 1E-1F -\par - Lesion sectioned superior to inferior -2\par \par Total blocks - 6\par \par \par \par \par \par \par \par URBAN, LILO                           3\par \par \par \par Surgical Final Report\par \par \par \par \par 2. The specimen is received fresh, labeled "right neck\par submandibular lymph node, sentinel lymph node" and consists of\par one soft tan red lymph node, measuring 1.2 x 0.6 x 0.4 cm. The\par specimen is serially sectioned, revealing a gray shiny smooth\par surface. The specimen is submitted entirely. (1 block)\par \par Specimen was received and underwent gross examination at [facility], [address] [place] [address],\[place] [address].\par \par 10/22/20 07:09 rr\par \par Perioperative Diagnosis\par Right scalp melanoma 1.8 mm (see report slides pending)\par \par  \par \par  Ordered by: BERNARDO DUMONT IV       Collected/Examined: 21Oct2020 03:00PM       \par Verification Required       Stage: Final       \par  Performed at: [facility]       Resulted: 04Nov2020 05:28PM       Last Updated: 04Nov2020 05:28PM       Accession: [id number]

## 2021-10-21 NOTE — ASSESSMENT
[FreeTextEntry1] : 71 y/o F with newly dx Malignant Melanoma of scalp, at least 1.8mm.\par Now 5 months s/p WLE of right scalp melanoma with SLN bx and coverage with STSG from right thigh, doing well.\par \par -Daily Aquaphor\par -Dermatologic surveillance\par -F/U 4 months  \par \par 10/21/21\par scalp, rioght neck and right thigh surgical sites fine\par no issues\par doing veryy well one year postop from sclp melanoma--margina fine\par \par pt very happy--no PRS issues\par \par seeing Gay from derm every 4 months\par \par no issues from m,y standpoint\par \par f/u 6 months w me, kady given sees derm q4 months\par \par Due to COVID 19, pre-visit patient instructions were explained to the patient and their symptoms were checked upon arrival.  \par Masks were used by the health care providers and staff and the examination room was cleaned after the patient visit was completed.\par \par

## 2022-05-03 ENCOUNTER — APPOINTMENT (OUTPATIENT)
Dept: PLASTIC SURGERY | Facility: CLINIC | Age: 72
End: 2022-05-03
Payer: MEDICARE

## 2022-05-03 PROCEDURE — 99212 OFFICE O/P EST SF 10 MIN: CPT

## 2022-05-03 NOTE — PHYSICAL EXAM
[de-identified] : well-appearing, NAD [de-identified] : right superior scalp with mature skin graft  [de-identified] : no palpable LAD [de-identified] : right posterior neck incision healed  [de-identified] : right thigh donor site stable and epithelializing

## 2022-05-03 NOTE — DATA REVIEWED
[FreeTextEntry1] :  Pathology             Final\par \par No Documents Attached\par \par \par \par   [facility] Accession Number : [id number]\par \par LILO PHILLIPS                           3\par \par \par \par Surgical Final Report\par \par \par \par \par Final Diagnosis\par 1. Right scalp melanoma, excision:\par - In-situ and invasive melanoma, completely excised.\par - Invasive melanoma thickness is 0.48 mm (Breslow).\par - Closest resection margin for in-situ melanoma is 1.6 cm (6:00\par margin).\par - Closest resection margin for invasive melanoma is 1.8 cm from\par the margin (6:00 margin).\par - Chronic inflammation, solar elastosis and focal fibrosis.\par - Immunohistochemical stain MART1/melan A highlights\par melanocytes.\par \par 2. Right neck submandibular lymph node (sentinel lymph node):\par - One lymph node negative for metastasis.\par - Immunohistochemical stain MART-1 / Melan A is negative.\par note: In the original biopsy report the depth of invasion is 1.8\par mm.\par \par Verified by: Arleth Quesada M.D.\par (Electronic Signature)\par Reported on: 11/04/20 17:28 EST, [address] [facility] [place] [address]\par Phone: [phone]   Fax: [phone]\par _________________________________________________________________\par \par Comment\par Case reported to Tumor Registry.\par Note: This case was reviewed in intradepartmental QA conference\par and the diagnosis represents a consensus opinion.\par \par Synoptic Summary\par SURGICAL PATHOLOGY CANCER CASE  SUMMARY\par \par MELANOMA OF THE SKIN:  Excision, Re-excision\par \par PROCEDURE: Excision\par SPECIMEN LATERALITY:  Right\par MACROSCOPIC SATELLITE NODULE: Not identified\par HISTOLOGIC TYPE\par INVASIVE MELANOMA:\par Melanoma, not otherwise classified\par MELANOMA IN-SITU (anatomic level 1)\par Melanoma in-situ, not otherwise classified\par MAXIMUM TUMOR (Breslow) THICKNESS:  0.48 mm\par ULCERATION:  Not identified\par MICROSATELLITE:  Not identified\par PERIPHERAL MARGINS:  Negative for invasive melanoma\par \par \par \par \par \par \par JACQUELINE, LILO                           3\par \par \par \par Surgical Final Report\par \par \par \par \par Distance of invasive melanoma from closest peripheral margin:15\par mm from 6 o'clock margin\par DEEP MARGIN:  Negative for invasive melanoma\par LYMPHOVASCULAR INVASION:  Not identified\par NEUROTROPISM:  Not identified\par TUMOR-INFILTRATING LYMPHOCYTES:  Present, nonbrisk\par TUMOR REGRESSION:  Not identified\par REGIONAL LYMPH  NODES:  Uninvolved by tumor cells\par TOTAL NUMBER OF LYMPH NODES EXAMINED:  1\par NUMBEROF SENTINEL NODES EXAMINED:  1\par PATHOLOGIC STAGE\par TNM DESCRIPTORS:\par PRIMARY TUMOR (pT):  pT1a\par REGIONAL LYMPH NODES (pN):  pN0\par \par Clinical History\par See report melanoma 1.8 mm right scalp - slides pending\par COVID (-)\par \par Specimen(s) Submitted\par 1     Right scalp melanoma\par 2     Right neck submandibular lymph node\par \par Gross Description\par 1. The specimen is received fresh, labeled "right scalp melanoma\par stitch marks 12 o'clock" and consists of one fragment of disc\par shaped skin and subcutaneous tissue with a stitch rich at 12\par o'clock, measuring 5.5 x 4.5 cm and 0.3 cm in thickness. The skin\par is tan white and wrinkled. There is a central, slightly depressed\par lesion with irregular margins, measuring 1.5 x 1.3 cm. The lesion\par is 1.7 cm from the 12 o'clock margin, 2 cm from the 3 o'clock\par margin, 1.5 cm from the 6 o'clock margin, 1.7 cm from the 9\par o'clock margin. The specimen is inked as follows: 12-3 o'clock -\par red, 3-6 o'clock - green, 6-9 o'clock - yellow, 9-12 o'clock -\par blue. The lesion is blocked out (1.7 x 1.7 cm) and serially\par sectioned from superior to inferior.  Representative sections are\par submitted.\par \par Summary of Sections:\par 1A -12-3 o'clock margin -1\par 1B - 3-6 o'clock margin -1\par 1C - 6-9 o'clock margin -1\par 1D - 9-12 o'clock margin -1\par 1E-1F -\par - Lesion sectioned superior to inferior -2\par \par Total blocks - 6\par \par \par \par \par \par \par \par URBAN, LILO                           3\par \par \par \par Surgical Final Report\par \par \par \par \par 2. The specimen is received fresh, labeled "right neck\par submandibular lymph node, sentinel lymph node" and consists of\par one soft tan red lymph node, measuring 1.2 x 0.6 x 0.4 cm. The\par specimen is serially sectioned, revealing a gray shiny smooth\par surface. The specimen is submitted entirely. (1 block)\par \par Specimen was received and underwent gross examination at [facility], [address] [place] [address],\[place] [address].\par \par 10/22/20 07:09 rr\par \par Perioperative Diagnosis\par Right scalp melanoma 1.8 mm (see report slides pending)\par \par  \par \par  Ordered by: BERNARDO DUMONT IV       Collected/Examined: 21Oct2020 03:00PM       \par Verification Required       Stage: Final       \par  Performed at: [facility]       Resulted: 04Nov2020 05:28PM       Last Updated: 04Nov2020 05:28PM       Accession: [id number]

## 2022-05-03 NOTE — ASSESSMENT
[FreeTextEntry1] : 70 y/o F with newly dx Malignant Melanoma of scalp, at least 1.8mm.\par Now s/p WLE of right scalp melanoma with SLN bx and coverage with STSG from right thigh 10/2020, doing well.\par \par -Daily Aquaphor\par -Dermatologic surveillance\par -F/U 6 months \par \par Due to COVID 19, pre-visit patient instructions were explained to the patient and their symptoms were checked upon arrival.  \par Masks were used by the health care providers and staff and the examination room was cleaned after the patient visit was completed.\par \par as above\par no issues\par posterior scalp fine, no evidence of recurrent pigmentattion\par rigth neck fine (site SLNB)\par \par f/u in Oct 2022 (will be two years postop at that time)\par \par Due to COVID 19, pre-visit patient instructions were explained to the patient and their symptoms were checked upon arrival.  \par Masks were used by the health care providers and staff and the examination room was cleaned after the patient visit was completed.\par \par

## 2022-05-03 NOTE — HISTORY OF PRESENT ILLNESS
[FreeTextEntry1] : 71 y/o F with PMH of multiple BCC who presents for evaluation of newly dx malignant melanoma to scalp, 1.8mm. Pt states it was found on routine dermatological exam. No other testing done yet.\par \par Nonsmoker,\par Nondiabetic\par Retired\par Lives alone\par \par Interval hx (10/28/20): Pt presents today POD #7 s/p WLE of right scalp melanoma with SLNx and coverage with STSG from right thigh. Denies pain, taking only Tylenol PRN. Completed PO abx. Denies f/c.\par \par Interval hx (11/4/20). Patient presents today POD #14 s/p WLE of right scalp melanoma with SLN bx and coverage with STSG from right thigh. Doing well with resolving discomfort. Denies any fever, chills or bleeding. \par \par Interval hx (11/17/20).  Patient presents today 4 weeks s/p WLE of right scalp melanoma with SLN bx and coverage with STSG from right thigh. Doing well with no significant complaints. \par \par Interval hx (3/9/21).  Patient presents today 5 months s/p WLE of right scalp melanoma with SLN bx and coverage with STSG from right thigh. Doing well. \par \par Interval hx (5/3/22).  Patient presents today for f/u s/p WLE of right scalp melanoma with SLN bx and coverage with STSG from right thigh 10/2020. Doing well and sees Dr. Gay regularly.

## 2022-07-11 NOTE — REASON FOR VISIT
-Patient felt to be lethargic and less conversant 7/7  -TSH, folate and Ammonia normal but found to have UTI.  -Noted prior ESBL E.coli UTI - started zosyn 7/8  -Maintain delirium precautions.  -Clinically improving - but lethargic/depressed at times.  Denies depression and today was much more alert.   [Post Op: _________] : a [unfilled] post op visit

## 2022-10-04 ENCOUNTER — APPOINTMENT (OUTPATIENT)
Dept: PLASTIC SURGERY | Facility: CLINIC | Age: 72
End: 2022-10-04

## 2022-10-04 PROCEDURE — 99212 OFFICE O/P EST SF 10 MIN: CPT

## 2022-10-04 NOTE — PHYSICAL EXAM
[de-identified] : well-appearing, NAD [de-identified] : right superior scalp with mature skin graft, no evidence of recurrence [de-identified] : no palpable LAD [de-identified] : right posterior neck incision healed  [de-identified] : right thigh donor site healed

## 2022-10-04 NOTE — DATA REVIEWED
[FreeTextEntry1] :  Pathology             Final\par \par No Documents Attached\par \par \par \par   [facility] Accession Number : [id number]\par \par LILO PHILLIPS                           3\par \par \par \par Surgical Final Report\par \par \par \par \par Final Diagnosis\par 1. Right scalp melanoma, excision:\par - In-situ and invasive melanoma, completely excised.\par - Invasive melanoma thickness is 0.48 mm (Breslow).\par - Closest resection margin for in-situ melanoma is 1.6 cm (6:00\par margin).\par - Closest resection margin for invasive melanoma is 1.8 cm from\par the margin (6:00 margin).\par - Chronic inflammation, solar elastosis and focal fibrosis.\par - Immunohistochemical stain MART1/melan A highlights\par melanocytes.\par \par 2. Right neck submandibular lymph node (sentinel lymph node):\par - One lymph node negative for metastasis.\par - Immunohistochemical stain MART-1 / Melan A is negative.\par note: In the original biopsy report the depth of invasion is 1.8\par mm.\par \par Verified by: Arleth Quesada M.D.\par (Electronic Signature)\par Reported on: 11/04/20 17:28 EST, [address] [facility] [address]\par Phone: [phone]   Fax: [phone]\par _________________________________________________________________\par \par Comment\par Case reported to Tumor Registry.\par Note: This case was reviewed in intradepartmental QA conference\par and the diagnosis represents a consensus opinion.\par \par Synoptic Summary\par SURGICAL PATHOLOGY CANCER CASE  SUMMARY\par \par MELANOMA OF THE SKIN:  Excision, Re-excision\par \par PROCEDURE: Excision\par SPECIMEN LATERALITY:  Right\par MACROSCOPIC SATELLITE NODULE: Not identified\par HISTOLOGIC TYPE\par INVASIVE MELANOMA:\par Melanoma, not otherwise classified\par MELANOMA IN-SITU (anatomic level 1)\par Melanoma in-situ, not otherwise classified\par MAXIMUM TUMOR (Breslow) THICKNESS:  0.48 mm\par ULCERATION:  Not identified\par MICROSATELLITE:  Not identified\par PERIPHERAL MARGINS:  Negative for invasive melanoma\par \par \par \par \par \par \par JACQUELINE, LILO                           3\par \par \par \par Surgical Final Report\par \par \par \par \par Distance of invasive melanoma from closest peripheral margin:15\par mm from 6 o'clock margin\par DEEP MARGIN:  Negative for invasive melanoma\par LYMPHOVASCULAR INVASION:  Not identified\par NEUROTROPISM:  Not identified\par TUMOR-INFILTRATING LYMPHOCYTES:  Present, nonbrisk\par TUMOR REGRESSION:  Not identified\par REGIONAL LYMPH  NODES:  Uninvolved by tumor cells\par TOTAL NUMBER OF LYMPH NODES EXAMINED:  1\par NUMBEROF SENTINEL NODES EXAMINED:  1\par PATHOLOGIC STAGE\par TNM DESCRIPTORS:\par PRIMARY TUMOR (pT):  pT1a\par REGIONAL LYMPH NODES (pN):  pN0\par \par Clinical History\par See report melanoma 1.8 mm right scalp - slides pending\par COVID (-)\par \par Specimen(s) Submitted\par 1     Right scalp melanoma\par 2     Right neck submandibular lymph node\par \par Gross Description\par 1. The specimen is received fresh, labeled "right scalp melanoma\par stitch marks 12 o'clock" and consists of one fragment of disc\par shaped skin and subcutaneous tissue with a stitch rich at 12\par o'clock, measuring 5.5 x 4.5 cm and 0.3 cm in thickness. The skin\par is tan white and wrinkled. There is a central, slightly depressed\par lesion with irregular margins, measuring 1.5 x 1.3 cm. The lesion\par is 1.7 cm from the 12 o'clock margin, 2 cm from the 3 o'clock\par margin, 1.5 cm from the 6 o'clock margin, 1.7 cm from the 9\par o'clock margin. The specimen is inked as follows: 12-3 o'clock -\par red, 3-6 o'clock - green, 6-9 o'clock - yellow, 9-12 o'clock -\par blue. The lesion is blocked out (1.7 x 1.7 cm) and serially\par sectioned from superior to inferior.  Representative sections are\par submitted.\par \par Summary of Sections:\par 1A -12-3 o'clock margin -1\par 1B - 3-6 o'clock margin -1\par 1C - 6-9 o'clock margin -1\par 1D - 9-12 o'clock margin -1\par 1E-1F -\par - Lesion sectioned superior to inferior -2\par \par Total blocks - 6\par \par \par \par \par \par \par \par URBAN, LILO                           3\par \par \par \par Surgical Final Report\par \par \par \par \par 2. The specimen is received fresh, labeled "right neck\par submandibular lymph node, sentinel lymph node" and consists of\par one soft tan red lymph node, measuring 1.2 x 0.6 x 0.4 cm. The\par specimen is serially sectioned, revealing a gray shiny smooth\par surface. The specimen is submitted entirely. (1 block)\par \par Specimen was received and underwent gross examination at [facility], [address] [place] [address],\[place] [address].\par \par 10/22/20 07:09 rr\par \par Perioperative Diagnosis\par Right scalp melanoma 1.8 mm (see report slides pending)\par \par  \par \par  Ordered by: BERNARDO DUMONT IV       Collected/Examined: 21Oct2020 03:00PM       \par Verification Required       Stage: Final       \par  Performed at: [facility]       Resulted: 04Nov2020 05:28PM       Last Updated: 04Nov2020 05:28PM       Accession: [id number]

## 2022-10-04 NOTE — HISTORY OF PRESENT ILLNESS
[FreeTextEntry1] : 71 y/o F with PMH of multiple BCC who presents for evaluation of newly dx malignant melanoma to scalp, 1.8mm. Pt states it was found on routine dermatological exam. No other testing done yet.\par \par Nonsmoker,\par Nondiabetic\par Retired\par Lives alone\par \par Interval hx (10/28/20): Pt presents today POD #7 s/p WLE of right scalp melanoma with SLNx and coverage with STSG from right thigh. Denies pain, taking only Tylenol PRN. Completed PO abx. Denies f/c.\par \par Interval hx (11/4/20). Patient presents today POD #14 s/p WLE of right scalp melanoma with SLN bx and coverage with STSG from right thigh. Doing well with resolving discomfort. Denies any fever, chills or bleeding. \par \par Interval hx (11/17/20).  Patient presents today 4 weeks s/p WLE of right scalp melanoma with SLN bx and coverage with STSG from right thigh. Doing well with no significant complaints. \par \par Interval hx (3/9/21).  Patient presents today 5 months s/p WLE of right scalp melanoma with SLN bx and coverage with STSG from right thigh. Doing well. \par \par Interval hx (5/3/22).  Patient presents today for f/u s/p WLE of right scalp melanoma with SLN bx and coverage with STSG from right thigh 10/2020. Doing well and sees Dr. Gay regularly. \par \par Interval hx (10/4/22).  Patient presents for f/u 2 years s/p WLE of right scalp melanoma with SLN bx and coverage with STSG from right thigh 10/2020. Offers no new complaints. Saw Dr. Penrose in September with no concerning findings.

## 2022-10-04 NOTE — ASSESSMENT
[FreeTextEntry1] : 71 y/o F with newly dx Malignant Melanoma of scalp, at least 1.8mm.\par Now 2 years s/p WLE of right scalp melanoma with SLN bx and coverage with STSG from right thigh 10/2020, doing well.\par \par -Daily Aquaphor\par -Dermatologic surveillance with Dr. Penrose\par -F/U 1 year\par \par Due to COVID 19, pre-visit patient instructions were explained to the patient and their symptoms were checked upon arrival.  \par Masks were used by the health care providers and staff and the examination room was cleaned after the patient visit was completed.\par \par \par as above\par doing well\par right occipital LN site fine\par posterior scalp fine\par 2 yrs postop\par \par surveillance PET scan\par \par f/u 6 mopnths but pt will call after PET scan\par \par Due to COVID 19, pre-visit patient instructions were explained to the patient and their symptoms were checked upon arrival.  \par Masks were used by the health care providers and staff and the examination room was cleaned after the patient visit was completed.\par \par \par \par \par \par

## 2022-12-09 ENCOUNTER — RESULT REVIEW (OUTPATIENT)
Age: 72
End: 2022-12-09

## 2022-12-30 ENCOUNTER — OUTPATIENT (OUTPATIENT)
Dept: OUTPATIENT SERVICES | Facility: HOSPITAL | Age: 72
LOS: 1 days | Discharge: HOME | End: 2022-12-30
Payer: MEDICARE

## 2022-12-30 DIAGNOSIS — Z98.890 OTHER SPECIFIED POSTPROCEDURAL STATES: Chronic | ICD-10-CM

## 2022-12-30 DIAGNOSIS — C43.4 MALIGNANT MELANOMA OF SCALP AND NECK: ICD-10-CM

## 2022-12-30 PROCEDURE — 74177 CT ABD & PELVIS W/CONTRAST: CPT | Mod: 26

## 2022-12-30 PROCEDURE — 71260 CT THORAX DX C+: CPT | Mod: 26

## 2023-04-04 ENCOUNTER — APPOINTMENT (OUTPATIENT)
Dept: PLASTIC SURGERY | Facility: CLINIC | Age: 73
End: 2023-04-04
Payer: MEDICARE

## 2023-04-04 PROCEDURE — 99212 OFFICE O/P EST SF 10 MIN: CPT

## 2023-04-04 NOTE — PHYSICAL EXAM
[de-identified] : well-appearing, NAD [de-identified] : right superior scalp with mature skin graft, no evidence of recurrence [de-identified] : no palpable LAD [de-identified] : right posterior neck incision healed  [de-identified] : right thigh donor site healed

## 2023-04-04 NOTE — ASSESSMENT
[FreeTextEntry1] : 71 y/o F with newly dx Malignant Melanoma of scalp, at least 1.8mm.\par Now 2 years s/p WLE of right scalp melanoma with SLN bx and coverage with STSG from right thigh 10/2020, doing well.\par \par -Daily Aquaphor\par -Dermatologic surveillance with Dr. Penrose\par -F/U 1 year\par \par Due to COVID 19, pre-visit patient instructions were explained to the patient and their symptoms were checked upon arrival.  \par Masks were used by the health care providers and staff and the examination room was cleaned after the patient visit was completed.\par \par \par as above\par doing well\par right occipital LN site fine\par posterior scalp fine\par 2 yrs postop\par \par surveillance PET scan\par \par f/u 6 mopnths but pt will call after PET scan\par \par Due to COVID 19, pre-visit patient instructions were explained to the patient and their symptoms were checked upon arrival.  \par Masks were used by the health care providers and staff and the examination room was cleaned after the patient visit was completed.\par \par 4/4/2023\par as above\par doing well\par no issues--appetite fine, no constuituitional sxs\par insurance denied PET scan=--triple CT scans done Dec 2022 negative for evidence of metastatic dz\par \par on exam, posterior sclap skin graft fine, no evidence of recurrent lesions (pigmented) ion area\par left occipital LN harvest site fine, B/L neck fine (no LAD)\par saw Penrose few weeks ago--no issues\par \par pt doing well 1.5 yrs postop from melanoma surgery\par \par f/u Oct 2023 (will be three years postop)\par \par pt happy and feels well\par \par \par \par \par

## 2023-04-04 NOTE — DATA REVIEWED
[FreeTextEntry1] :  Pathology             Final\par \par No Documents Attached\par \par \par \par   [facility] Accession Number : [id number]\par \par LILO PHILLIPS                           3\par \par \par \par Surgical Final Report\par \par \par \par \par Final Diagnosis\par 1. Right scalp melanoma, excision:\par - In-situ and invasive melanoma, completely excised.\par - Invasive melanoma thickness is 0.48 mm (Breslow).\par - Closest resection margin for in-situ melanoma is 1.6 cm (6:00\par margin).\par - Closest resection margin for invasive melanoma is 1.8 cm from\par the margin (6:00 margin).\par - Chronic inflammation, solar elastosis and focal fibrosis.\par - Immunohistochemical stain MART1/melan A highlights\par melanocytes.\par \par 2. Right neck submandibular lymph node (sentinel lymph node):\par - One lymph node negative for metastasis.\par - Immunohistochemical stain MART-1 / Melan A is negative.\par note: In the original biopsy report the depth of invasion is 1.8\par mm.\par \par Verified by: Arleth Quesada M.D.\par (Electronic Signature)\par Reported on: 11/04/20 17:28 EST, [address] [facility] [address]\par Phone: [phone]   Fax: [phone]\par _________________________________________________________________\par \par Comment\par Case reported to Tumor Registry.\par Note: This case was reviewed in intradepartmental QA conference\par and the diagnosis represents a consensus opinion.\par \par Synoptic Summary\par SURGICAL PATHOLOGY CANCER CASE  SUMMARY\par \par MELANOMA OF THE SKIN:  Excision, Re-excision\par \par PROCEDURE: Excision\par SPECIMEN LATERALITY:  Right\par MACROSCOPIC SATELLITE NODULE: Not identified\par HISTOLOGIC TYPE\par INVASIVE MELANOMA:\par Melanoma, not otherwise classified\par MELANOMA IN-SITU (anatomic level 1)\par Melanoma in-situ, not otherwise classified\par MAXIMUM TUMOR (Breslow) THICKNESS:  0.48 mm\par ULCERATION:  Not identified\par MICROSATELLITE:  Not identified\par PERIPHERAL MARGINS:  Negative for invasive melanoma\par \par \par \par \par \par \par JACQUELINE, LILO                           3\par \par \par \par Surgical Final Report\par \par \par \par \par Distance of invasive melanoma from closest peripheral margin:15\par mm from 6 o'clock margin\par DEEP MARGIN:  Negative for invasive melanoma\par LYMPHOVASCULAR INVASION:  Not identified\par NEUROTROPISM:  Not identified\par TUMOR-INFILTRATING LYMPHOCYTES:  Present, nonbrisk\par TUMOR REGRESSION:  Not identified\par REGIONAL LYMPH  NODES:  Uninvolved by tumor cells\par TOTAL NUMBER OF LYMPH NODES EXAMINED:  1\par NUMBEROF SENTINEL NODES EXAMINED:  1\par PATHOLOGIC STAGE\par TNM DESCRIPTORS:\par PRIMARY TUMOR (pT):  pT1a\par REGIONAL LYMPH NODES (pN):  pN0\par \par Clinical History\par See report melanoma 1.8 mm right scalp - slides pending\par COVID (-)\par \par Specimen(s) Submitted\par 1     Right scalp melanoma\par 2     Right neck submandibular lymph node\par \par Gross Description\par 1. The specimen is received fresh, labeled "right scalp melanoma\par stitch marks 12 o'clock" and consists of one fragment of disc\par shaped skin and subcutaneous tissue with a stitch rich at 12\par o'clock, measuring 5.5 x 4.5 cm and 0.3 cm in thickness. The skin\par is tan white and wrinkled. There is a central, slightly depressed\par lesion with irregular margins, measuring 1.5 x 1.3 cm. The lesion\par is 1.7 cm from the 12 o'clock margin, 2 cm from the 3 o'clock\par margin, 1.5 cm from the 6 o'clock margin, 1.7 cm from the 9\par o'clock margin. The specimen is inked as follows: 12-3 o'clock -\par red, 3-6 o'clock - green, 6-9 o'clock - yellow, 9-12 o'clock -\par blue. The lesion is blocked out (1.7 x 1.7 cm) and serially\par sectioned from superior to inferior.  Representative sections are\par submitted.\par \par Summary of Sections:\par 1A -12-3 o'clock margin -1\par 1B - 3-6 o'clock margin -1\par 1C - 6-9 o'clock margin -1\par 1D - 9-12 o'clock margin -1\par 1E-1F -\par - Lesion sectioned superior to inferior -2\par \par Total blocks - 6\par \par \par \par \par \par \par \par URBAN, LILO                           3\par \par \par \par Surgical Final Report\par \par \par \par \par 2. The specimen is received fresh, labeled "right neck\par submandibular lymph node, sentinel lymph node" and consists of\par one soft tan red lymph node, measuring 1.2 x 0.6 x 0.4 cm. The\par specimen is serially sectioned, revealing a gray shiny smooth\par surface. The specimen is submitted entirely. (1 block)\par \par Specimen was received and underwent gross examination at [facility], [address] [place] [address],\[place] [address].\par \par 10/22/20 07:09 rr\par \par Perioperative Diagnosis\par Right scalp melanoma 1.8 mm (see report slides pending)\par \par  \par \par  Ordered by: BERNARDO DUMONT IV       Collected/Examined: 21Oct2020 03:00PM       \par Verification Required       Stage: Final       \par  Performed at: [facility]       Resulted: 04Nov2020 05:28PM       Last Updated: 04Nov2020 05:28PM       Accession: [id number]

## 2023-10-03 ENCOUNTER — APPOINTMENT (OUTPATIENT)
Dept: PLASTIC SURGERY | Facility: CLINIC | Age: 73
End: 2023-10-03
Payer: MEDICARE

## 2023-10-03 PROCEDURE — 99212 OFFICE O/P EST SF 10 MIN: CPT

## 2024-02-13 ENCOUNTER — APPOINTMENT (OUTPATIENT)
Dept: HEMATOLOGY ONCOLOGY | Facility: CLINIC | Age: 74
End: 2024-02-13

## 2024-02-13 DIAGNOSIS — Z85.828 PERSONAL HISTORY OF OTHER MALIGNANT NEOPLASM OF SKIN: ICD-10-CM

## 2024-03-27 ENCOUNTER — APPOINTMENT (OUTPATIENT)
Age: 74
End: 2024-03-27
Payer: MEDICARE

## 2024-03-27 ENCOUNTER — OUTPATIENT (OUTPATIENT)
Dept: OUTPATIENT SERVICES | Facility: HOSPITAL | Age: 74
LOS: 1 days | End: 2024-03-27
Payer: MEDICARE

## 2024-03-27 ENCOUNTER — LABORATORY RESULT (OUTPATIENT)
Age: 74
End: 2024-03-27

## 2024-03-27 VITALS
BODY MASS INDEX: 23.19 KG/M2 | HEART RATE: 78 BPM | RESPIRATION RATE: 16 BRPM | WEIGHT: 126 LBS | HEIGHT: 62 IN | TEMPERATURE: 98.2 F | SYSTOLIC BLOOD PRESSURE: 128 MMHG | DIASTOLIC BLOOD PRESSURE: 67 MMHG

## 2024-03-27 DIAGNOSIS — Z98.890 OTHER SPECIFIED POSTPROCEDURAL STATES: Chronic | ICD-10-CM

## 2024-03-27 DIAGNOSIS — D72.10 EOSINOPHILIA, UNSPECIFIED: ICD-10-CM

## 2024-03-27 DIAGNOSIS — D72.19 OTHER EOSINOPHILIA: ICD-10-CM

## 2024-03-27 PROBLEM — Z85.828 HISTORY OF BASAL CELL CARCINOMA (BCC): Status: RESOLVED | Noted: 2020-10-02 | Resolved: 2024-03-27

## 2024-03-27 LAB
ALBUMIN SERPL ELPH-MCNC: 4.9 G/DL
ALP BLD-CCNC: 61 U/L
ALT SERPL-CCNC: 24 U/L
ANION GAP SERPL CALC-SCNC: 10 MMOL/L
AST SERPL-CCNC: 21 U/L
BILIRUB DIRECT SERPL-MCNC: <0.2 MG/DL
BILIRUB INDIRECT SERPL-MCNC: >0.2 MG/DL
BILIRUB SERPL-MCNC: 0.4 MG/DL
BUN SERPL-MCNC: 19 MG/DL
CALCIUM SERPL-MCNC: 9.5 MG/DL
CHLORIDE SERPL-SCNC: 103 MMOL/L
CO2 SERPL-SCNC: 26 MMOL/L
CREAT SERPL-MCNC: 0.7 MG/DL
EGFR: 90 ML/MIN/1.73M2
GLUCOSE SERPL-MCNC: 97 MG/DL
HCT VFR BLD CALC: 39.9 %
HGB BLD-MCNC: 13.1 G/DL
MCHC RBC-ENTMCNC: 32.1 PG
MCHC RBC-ENTMCNC: 32.8 G/DL
MCV RBC AUTO: 97.8 FL
PLATELET # BLD AUTO: 224 K/UL
PMV BLD: 11 FL
POTASSIUM SERPL-SCNC: 4.3 MMOL/L
PROT SERPL-MCNC: 7 G/DL
RBC # BLD: 4.08 M/UL
RBC # FLD: 12 %
SODIUM SERPL-SCNC: 139 MMOL/L
WBC # FLD AUTO: 8.08 K/UL

## 2024-03-27 PROCEDURE — 85027 COMPLETE CBC AUTOMATED: CPT

## 2024-03-27 PROCEDURE — 82728 ASSAY OF FERRITIN: CPT

## 2024-03-27 PROCEDURE — 83550 IRON BINDING TEST: CPT

## 2024-03-27 PROCEDURE — 86431 RHEUMATOID FACTOR QUANT: CPT

## 2024-03-27 PROCEDURE — 83540 ASSAY OF IRON: CPT

## 2024-03-27 PROCEDURE — 99205 OFFICE O/P NEW HI 60 MIN: CPT

## 2024-03-27 PROCEDURE — 80076 HEPATIC FUNCTION PANEL: CPT

## 2024-03-27 PROCEDURE — 80048 BASIC METABOLIC PNL TOTAL CA: CPT

## 2024-03-27 PROCEDURE — 86038 ANTINUCLEAR ANTIBODIES: CPT

## 2024-03-27 NOTE — ASSESSMENT
[FreeTextEntry1] :  72 yo woman is evaluated for elevated eosinophil count.  She denies B symptoms (wt loss, fever, drenching night sweats); denies bone pain or joint pain; denies cough/SOB;denies abdomen pain / diarreha Eosinophil count in 11/2023 was 1,5K; 03/2024 it is 0.76. Differential diagnosis of elevated eosinophil count includes: 1. allergy/ including drugs reaction 2. pathogens/ parasites 3. collagen vascular diseases 4. myeloproliferative neoplasm 5. sekou disease a)will send DESMOND, RF; will send stool parasites b) hyperosinophilic syndrome is suspected when the absolute count is above 1.5 on at least 2 different occasions one month apart; will need to get old records to evaluate the trend of CBC/DIFF over the past 5 years  # Invasive melanoma, dx 10/2020 - s/p WLE of right scalp melanoma with SLN bx and coverage with STSG from right thigh 10/2020  - followup with Dermatologic surveillance with Dr. Penrose

## 2024-03-27 NOTE — HISTORY OF PRESENT ILLNESS
[de-identified] : Ms. LILO PHILLIPS is a 73 year old female here today for evaluation and management of melanoma.    LILO is a 73 year old F with PMHx including basal cell carcinoma of skin, who presents to clinic to establish care.     She is presently feeling well with no new complaints.  Patient denies fever, chills, nausea, vomiting, dyspnea, unintentional weight loss or bleeding.  Patient reports family history of malignancy or blood/clotting disorder.     RADIOLOGIC WORKUP CT C/A/P (12.30.2022) IMPRESSION:No CT evidence for metastatic disease to the chest, abdomen or pelvis. CT Neck (10.17.2020) IMPRESSION:1.  No imaging evidence for pathologic lymphadenopathy.2.  Multiple thyroid nodules as described, non-FDG avid correlated with PET.3.  Otherwise unremarkable study PET/CT WB (10.9.2020)  IMPRESSION:No evidence of FDG avid metastatic disease. Primary right scalp lesion not identified.

## 2024-03-27 NOTE — CONSULT LETTER
[Dear  ___] : Dear  [unfilled], [Consult Letter:] : I had the pleasure of evaluating your patient, [unfilled]. [Please see my note below.] : Please see my note below. [Sincerely,] : Sincerely, [Consult Closing:] : Thank you very much for allowing me to participate in the care of this patient.  If you have any questions, please do not hesitate to contact me. [FreeTextEntry3] : Neil Chow DO Attending Physician, Hematology/ Medical Oncology [phone] office

## 2024-03-27 NOTE — ASSESSMENT
[FreeTextEntry1] :  74 yo woman is evaluated for elevated eosinophil count.  She denies B symptoms (wt loss, fever, drenching night sweats); denies bone pain or joint pain; denies cough/SOB;denies abdomen pain / diarreha Eosinophil count in 11/2023 was 1,5K; 03/2024 it is 0.76. Differential diagnosis of elevated eosinophil count includes: 1. allergy/ including drugs reaction 2. pathogens/ parasites 3. collagen vascular diseases 4. myeloproliferative neoplasm 5. sekou disease a)will send DESMOND, RF; will send stool parasites b) hyperosinophilic syndrome is suspected when the absolute count is above 1.5 on at least 2 different occasions one month apart; will need to get old records to evaluate the trend of CBC/DIFF over the past 5 years  # Invasive melanoma, dx 10/2020 - s/p WLE of right scalp melanoma with SLN bx and coverage with STSG from right thigh 10/2020  - followup with Dermatologic surveillance with Dr. Penrose

## 2024-03-27 NOTE — HISTORY OF PRESENT ILLNESS
[de-identified] : Ms. LILO PHILLIPS is a 73 year old female here today for evaluation and management of melanoma.    LILO is a 73 year old F with PMHx including basal cell carcinoma of skin, who presents to clinic to establish care.     She is presently feeling well with no new complaints.  Patient denies fever, chills, nausea, vomiting, dyspnea, unintentional weight loss or bleeding.  Patient reports family history of malignancy or blood/clotting disorder.     RADIOLOGIC WORKUP CT C/A/P (12.30.2022) IMPRESSION:No CT evidence for metastatic disease to the chest, abdomen or pelvis. CT Neck (10.17.2020) IMPRESSION:1.  No imaging evidence for pathologic lymphadenopathy.2.  Multiple thyroid nodules as described, non-FDG avid correlated with PET.3.  Otherwise unremarkable study PET/CT WB (10.9.2020)  IMPRESSION:No evidence of FDG avid metastatic disease. Primary right scalp lesion not identified.

## 2024-03-28 LAB
FERRITIN SERPL-MCNC: 168 NG/ML
IRON SATN MFR SERPL: 31 %
IRON SERPL-MCNC: 99 UG/DL
RHEUMATOID FACT SER QL: 11 IU/ML
TIBC SERPL-MCNC: 322 UG/DL
UIBC SERPL-MCNC: 223 UG/DL

## 2024-03-29 LAB — ANA SER IF-ACNC: NEGATIVE

## 2024-03-30 DIAGNOSIS — D72.19 OTHER EOSINOPHILIA: ICD-10-CM

## 2024-04-25 NOTE — CONSULT LETTER
Please stop at the lab ([address]) to complete your blood and/or urine work that I've ordered for you.    I will contact you with the results at my soonest convenience. I strongly urge you to use [employer] as this is the quickest and easiest way to access your results and receive my correspondences.    Your medications are up to date today, I will renew them when a refill is due.    Keep an eye on your blood sugars at home. No medication changes today, but I do recommend lifestyle changes to get the sugars back in range.    [Dear  ___] : Dear  [unfilled], [Consult Letter:] : I had the pleasure of evaluating your patient, [unfilled]. [Please see my note below.] : Please see my note below. [Consult Closing:] : Thank you very much for allowing me to participate in the care of this patient.  If you have any questions, please do not hesitate to contact me. [Sincerely,] : Sincerely, [FreeTextEntry3] : Neil Chow DO Attending Physician, Hematology/ Medical Oncology [phone] office

## 2024-05-01 ENCOUNTER — APPOINTMENT (OUTPATIENT)
Age: 74
End: 2024-05-01
Payer: MEDICARE

## 2024-05-01 ENCOUNTER — OUTPATIENT (OUTPATIENT)
Dept: OUTPATIENT SERVICES | Facility: HOSPITAL | Age: 74
LOS: 1 days | End: 2024-05-01
Payer: MEDICARE

## 2024-05-01 ENCOUNTER — LABORATORY RESULT (OUTPATIENT)
Age: 74
End: 2024-05-01

## 2024-05-01 VITALS
SYSTOLIC BLOOD PRESSURE: 133 MMHG | HEIGHT: 62 IN | DIASTOLIC BLOOD PRESSURE: 76 MMHG | RESPIRATION RATE: 16 BRPM | BODY MASS INDEX: 22.08 KG/M2 | WEIGHT: 120 LBS | TEMPERATURE: 98.4 F | HEART RATE: 76 BPM

## 2024-05-01 DIAGNOSIS — Z98.890 OTHER SPECIFIED POSTPROCEDURAL STATES: Chronic | ICD-10-CM

## 2024-05-01 DIAGNOSIS — D72.10 EOSINOPHILIA, UNSPECIFIED: ICD-10-CM

## 2024-05-01 DIAGNOSIS — C43.4 MALIGNANT MELANOMA OF SCALP AND NECK: ICD-10-CM

## 2024-05-01 LAB
HCT VFR BLD CALC: 39 %
HGB BLD-MCNC: 13.2 G/DL
MCHC RBC-ENTMCNC: 33.2 PG
MCHC RBC-ENTMCNC: 33.8 G/DL
MCV RBC AUTO: 98 FL
PLATELET # BLD AUTO: 202 K/UL
PMV BLD: 11.1 FL
RBC # BLD: 3.98 M/UL
RBC # FLD: 12.1 %
WBC # FLD AUTO: 6.16 K/UL

## 2024-05-01 PROCEDURE — 85027 COMPLETE CBC AUTOMATED: CPT

## 2024-05-01 PROCEDURE — 99214 OFFICE O/P EST MOD 30 MIN: CPT

## 2024-05-01 NOTE — PHYSICAL EXAM
[Restricted in physically strenuous activity but ambulatory and able to carry out work of a light or sedentary nature] : Status 1- Restricted in physically strenuous activity but ambulatory and able to carry out work of a light or sedentary nature, e.g., light house work, office work [Normal] : affect appropriate [de-identified] : healed scars from skin tissue biopsy , noted on head / scalp

## 2024-05-01 NOTE — REVIEW OF SYSTEMS
[Diarrhea: Grade 0] : Diarrhea: Grade 0 [Negative] : Allergic/Immunologic [Recent Change In Weight] : ~T recent weight change [FreeTextEntry2] : 6lb weight loss, cutting down on sweets

## 2024-05-01 NOTE — CONSULT LETTER
[Dear  ___] : Dear  [unfilled], [Consult Letter:] : I had the pleasure of evaluating your patient, [unfilled]. [Please see my note below.] : Please see my note below. [Consult Closing:] : Thank you very much for allowing me to participate in the care of this patient.  If you have any questions, please do not hesitate to contact me. [Sincerely,] : Sincerely, [FreeTextEntry3] : Neil Meek NP  Hematology/ Medical Oncology [phone] office

## 2024-05-01 NOTE — HISTORY OF PRESENT ILLNESS
[de-identified] : Ms. LILO PHILLIPS is a 73 year old female here today for evaluation and management of melanoma.    LILO is a 73 year old F with PMHx including basal cell carcinoma of skin, who presents to clinic to establish care.     She is presently feeling well with no new complaints.  Patient denies fever, chills, nausea, vomiting, dyspnea, unintentional weight loss or bleeding.  Patient reports family history of malignancy or blood/clotting disorder.     RADIOLOGIC WORKUP CT C/A/P (12.30.2022) IMPRESSION:No CT evidence for metastatic disease to the chest, abdomen or pelvis. CT Neck (10.17.2020) IMPRESSION:1.  No imaging evidence for pathologic lymphadenopathy.2.  Multiple thyroid nodules as described, non-FDG avid correlated with PET.3.  Otherwise unremarkable study PET/CT WB (10.9.2020)  IMPRESSION:No evidence of FDG avid metastatic disease. Primary right scalp lesion not identified.     [de-identified] : 5/1/24 Patient is here for a follow-up visit for eosinophilia and hx of Melanomam, accompanied by friend.  She is feeling well with no new complaints; she lost a few pounds due to cutting out sweets from her diet.  Reviewed most recent CBC, which is stable with very mild eosinophilia but normal total WBC.  Patient denies fever, chills, drenching night sweats, unintentional weight loss, dyspnea or overt bleeding.  She says she has environmental allergies year-round, but they are no worse today.  She follows with dermatology every 6 months; recently had a lesion frozen off from forehead in 04/2024 which was reportedly non-cancerous.  She also has followup scheduled with Plastic SURG, Dr. Reynolds.

## 2024-05-01 NOTE — ASSESSMENT
[FreeTextEntry1] : 72 yo woman is evaluated for elevated eosinophil count.   # Eosinophilia ; ddx includes:  1. allergy/ including drugs reaction 2. pathogens/ parasites 3. collagen vascular diseases 4. myeloproliferative neoplasm 5. sekou disease  - eos count in 11/2023 was 1,5K; 03/2024 it is 0.76. - She denies B symptoms (wt loss, fever, drenching night sweats); denies bone pain or joint pain; denies cough/SOB;denies abdomen pain / diarreha - DESMOND, RF normal from 03/2024 - hyperosinophilic syndrome is suspected when the absolute count is above 1.5 on at least 2 different occasions one month apart; will need to get old records to evaluate the trend of CBC/DIFF over the past 5 years  # Invasive melanoma, dx 10/2020  - s/p WLE of right scalp melanoma with SLN bx and coverage with STSG from right thigh 10/2020  - followup with Dermatologic surveillance with Dr. Penrose every 6 months  - followup with Plastic SURG, Dr. Reynolds, as scheduled   RTC in 3-4 months with Dr. Chow with CBC prior

## 2024-05-02 DIAGNOSIS — C43.4 MALIGNANT MELANOMA OF SCALP AND NECK: ICD-10-CM

## 2024-09-25 ENCOUNTER — OUTPATIENT (OUTPATIENT)
Dept: OUTPATIENT SERVICES | Facility: HOSPITAL | Age: 74
LOS: 1 days | End: 2024-09-25
Payer: MEDICARE

## 2024-09-25 ENCOUNTER — APPOINTMENT (OUTPATIENT)
Age: 74
End: 2024-09-25
Payer: MEDICARE

## 2024-09-25 ENCOUNTER — LABORATORY RESULT (OUTPATIENT)
Age: 74
End: 2024-09-25

## 2024-09-25 VITALS
HEART RATE: 78 BPM | OXYGEN SATURATION: 100 % | BODY MASS INDEX: 22.26 KG/M2 | DIASTOLIC BLOOD PRESSURE: 74 MMHG | RESPIRATION RATE: 17 BRPM | WEIGHT: 121 LBS | TEMPERATURE: 97.3 F | SYSTOLIC BLOOD PRESSURE: 143 MMHG | HEIGHT: 62 IN

## 2024-09-25 DIAGNOSIS — Z98.890 OTHER SPECIFIED POSTPROCEDURAL STATES: Chronic | ICD-10-CM

## 2024-09-25 DIAGNOSIS — D72.10 EOSINOPHILIA, UNSPECIFIED: ICD-10-CM

## 2024-09-25 DIAGNOSIS — C43.4 MALIGNANT MELANOMA OF SCALP AND NECK: ICD-10-CM

## 2024-09-25 LAB
HCT VFR BLD CALC: 39.3 %
HGB BLD-MCNC: 13.4 G/DL
MCHC RBC-ENTMCNC: 33.1 PG
MCHC RBC-ENTMCNC: 34.1 G/DL
MCV RBC AUTO: 97 FL
PLATELET # BLD AUTO: 206 K/UL
PMV BLD: 11.2 FL
RBC # BLD: 4.05 M/UL
RBC # FLD: 12 %
WBC # FLD AUTO: 8.14 K/UL

## 2024-09-25 PROCEDURE — 99214 OFFICE O/P EST MOD 30 MIN: CPT

## 2024-09-25 PROCEDURE — 85027 COMPLETE CBC AUTOMATED: CPT

## 2024-09-25 PROCEDURE — G2211 COMPLEX E/M VISIT ADD ON: CPT

## 2024-09-25 NOTE — PHYSICAL EXAM
[Restricted in physically strenuous activity but ambulatory and able to carry out work of a light or sedentary nature] : Status 1- Restricted in physically strenuous activity but ambulatory and able to carry out work of a light or sedentary nature, e.g., light house work, office work [Normal] : affect appropriate [de-identified] : healed scars from skin tissue biopsy , noted on head / scalp

## 2024-09-25 NOTE — ASSESSMENT
[FreeTextEntry1] : 72 yo woman is evaluated for elevated eosinophil count.   # Eosinophilia ; ddx includes:  1. allergy/ including drugs reaction 2. pathogens/ parasites 3. collagen vascular diseases 4. myeloproliferative neoplasm 5. sekou disease  - eos count in 11/2023 was 1,5K; 03/2024 it is 0.76. 09/2024, again 1.4, which is against MPN - She denies B symptoms (wt loss, fever, drenching night sweats); denies bone pain or joint pain; denies cough/SOB;denies abdomen pain / diarreha - DESMOND, RF normal from 03/2024 - hyperosinophilic syndrome is suspected when the absolute count is above 1.5 on at least 2 different occasions one month apart; will need to get old records to evaluate the trend of CBC/DIFF over the past 5 years  # Invasive melanoma, dx 10/2020  - s/p WLE of right scalp melanoma with SLN bx and coverage with STSG from right thigh 10/2020  - followup with Dermatologic surveillance with Dr. Penrose every 6 months  - followup with Plastic SURG, Dr. Reynolds, as scheduled   RTC in 2 months with Dr. Chow with CBC prior

## 2024-09-25 NOTE — PHYSICAL EXAM
[Restricted in physically strenuous activity but ambulatory and able to carry out work of a light or sedentary nature] : Status 1- Restricted in physically strenuous activity but ambulatory and able to carry out work of a light or sedentary nature, e.g., light house work, office work [Normal] : affect appropriate [de-identified] : healed scars from skin tissue biopsy , noted on head / scalp

## 2024-09-25 NOTE — HISTORY OF PRESENT ILLNESS
[de-identified] : Ms. LILO PHILLIPS is a 73 year old female here today for evaluation and management of melanoma.    LILO is a 73 year old F with PMHx including basal cell carcinoma of skin, who presents to clinic to establish care.     She is presently feeling well with no new complaints.  Patient denies fever, chills, nausea, vomiting, dyspnea, unintentional weight loss or bleeding.  Patient reports family history of malignancy or blood/clotting disorder.     RADIOLOGIC WORKUP CT C/A/P (12.30.2022) IMPRESSION:No CT evidence for metastatic disease to the chest, abdomen or pelvis. CT Neck (10.17.2020) IMPRESSION:1.  No imaging evidence for pathologic lymphadenopathy.2.  Multiple thyroid nodules as described, non-FDG avid correlated with PET.3.  Otherwise unremarkable study PET/CT WB (10.9.2020)  IMPRESSION:No evidence of FDG avid metastatic disease. Primary right scalp lesion not identified.     [de-identified] : 5/1/24 Patient is here for a follow-up visit for eosinophilia and hx of Melanomam, accompanied by friend.  She is feeling well with no new complaints; she lost a few pounds due to cutting out sweets from her diet.  Reviewed most recent CBC, which is stable with very mild eosinophilia but normal total WBC.  Patient denies fever, chills, drenching night sweats, unintentional weight loss, dyspnea or overt bleeding.  She says she has environmental allergies year-round, but they are no worse today.  She follows with dermatology every 6 months; recently had a lesion frozen off from forehead in 04/2024 which was reportedly non-cancerous.  She also has followup scheduled with Plastic SURG, Dr. Reynolds.    9/25/24

## 2024-09-25 NOTE — REVIEW OF SYSTEMS
[Recent Change In Weight] : ~T recent weight change [Diarrhea: Grade 0] : Diarrhea: Grade 0 [Negative] : Allergic/Immunologic [FreeTextEntry2] : 6lb weight loss, cutting down on sweets

## 2024-09-25 NOTE — HISTORY OF PRESENT ILLNESS
[de-identified] : Ms. LILO PHILLIPS is a 73 year old female here today for evaluation and management of melanoma.    LILO is a 73 year old F with PMHx including basal cell carcinoma of skin, who presents to clinic to establish care.     She is presently feeling well with no new complaints.  Patient denies fever, chills, nausea, vomiting, dyspnea, unintentional weight loss or bleeding.  Patient reports family history of malignancy or blood/clotting disorder.     RADIOLOGIC WORKUP CT C/A/P (12.30.2022) IMPRESSION:No CT evidence for metastatic disease to the chest, abdomen or pelvis. CT Neck (10.17.2020) IMPRESSION:1.  No imaging evidence for pathologic lymphadenopathy.2.  Multiple thyroid nodules as described, non-FDG avid correlated with PET.3.  Otherwise unremarkable study PET/CT WB (10.9.2020)  IMPRESSION:No evidence of FDG avid metastatic disease. Primary right scalp lesion not identified.     [de-identified] : 5/1/24 Patient is here for a follow-up visit for eosinophilia and hx of Melanomam, accompanied by friend.  She is feeling well with no new complaints; she lost a few pounds due to cutting out sweets from her diet.  Reviewed most recent CBC, which is stable with very mild eosinophilia but normal total WBC.  Patient denies fever, chills, drenching night sweats, unintentional weight loss, dyspnea or overt bleeding.  She says she has environmental allergies year-round, but they are no worse today.  She follows with dermatology every 6 months; recently had a lesion frozen off from forehead in 04/2024 which was reportedly non-cancerous.  She also has followup scheduled with Plastic SURG, Dr. Reynolds.    9/25/24

## 2024-09-26 DIAGNOSIS — C43.4 MALIGNANT MELANOMA OF SCALP AND NECK: ICD-10-CM

## 2024-10-01 ENCOUNTER — APPOINTMENT (OUTPATIENT)
Dept: PLASTIC SURGERY | Facility: CLINIC | Age: 74
End: 2024-10-01
Payer: MEDICARE

## 2024-10-01 PROCEDURE — 99213 OFFICE O/P EST LOW 20 MIN: CPT

## 2024-10-01 NOTE — PHYSICAL EXAM
[de-identified] : well-appearing, NAD [de-identified] : right superior scalp with mature skin graft, no evidence of recurrence [de-identified] : no palpable LAD [de-identified] : right posterior neck incision healed  [de-identified] : right thigh donor site healed

## 2024-10-01 NOTE — ASSESSMENT
[FreeTextEntry1] : 71 y/o F with newly dx Malignant Melanoma of scalp, at least 1.8mm. Now 2 years s/p WLE of right scalp melanoma with SLN bx and coverage with STSG from right thigh 10/2020, doing well.  -Daily Aquaphor -Dermatologic surveillance with Dr. Penrose -F/U 1 year  Due to COVID 19, pre-visit patient instructions were explained to the patient and their symptoms were checked upon arrival.   Masks were used by the health care providers and staff and the examination room was cleaned after the patient visit was completed.   as above doing well right occipital LN site fine posterior scalp fine 2 yrs postop  surveillance PET scan  f/u 6 mopnths but pt will call after PET scan  Due to COVID 19, pre-visit patient instructions were explained to the patient and their symptoms were checked upon arrival.   Masks were used by the health care providers and staff and the examination room was cleaned after the patient visit was completed.  4/4/2023 as above doing well no issues--appetite fine, no constuituitional sxs insurance denied PET scan=--triple CT scans done Dec 2022 negative for evidence of metastatic dz  on exam, posterior sclap skin graft fine, no evidence of recurrent lesions (pigmented) ion area left occipital LN harvest site fine, B/L neck fine (no LAD) saw Penrose few weeks ago--no issues  pt doing well 1.5 yrs postop from melanoma surgery  f/u Oct 2023 (will be three years postop)  pt happy and feels well  10/3/2023 as above scalp skin graft fine no recurrent pigmented lesion (3 yrs s/p excision of melanoma w STSG)   cont derm surveillance  f/u w me in one year   10/1/61287 doing very well since excision of scalp melanaom w negative SLNB 4 yrs ago sees Penrose reqeusted additional derm names in case she cannot get into to see Penrose  pt very happy no iissues, doing very well pt happy  f/u in one year (will be five years)

## 2024-10-01 NOTE — DATA REVIEWED
[FreeTextEntry1] :  Pathology             Final\par  \par  No Documents Attached\par  \par  \par  \par    [facility] Accession Number : [id number]\par  \par  LILO PHILLIPS                           3\par  \par  \par  \par  Surgical Final Report\par  \par  \par  \par  \par  Final Diagnosis\par  1. Right scalp melanoma, excision:\par  - In-situ and invasive melanoma, completely excised.\par  - Invasive melanoma thickness is 0.48 mm (Breslow).\par  - Closest resection margin for in-situ melanoma is 1.6 cm (6:00\par  margin).\par  - Closest resection margin for invasive melanoma is 1.8 cm from\par  the margin (6:00 margin).\par  - Chronic inflammation, solar elastosis and focal fibrosis.\par  - Immunohistochemical stain MART1/melan A highlights\par  melanocytes.\par  \par  2. Right neck submandibular lymph node (sentinel lymph node):\par  - One lymph node negative for metastasis.\par  - Immunohistochemical stain MART-1 / Melan A is negative.\par  note: In the original biopsy report the depth of invasion is 1.8\par  mm.\par  \par  Verified by: Arleth Quesada M.D.\par  (Electronic Signature)\par  Reported on: 11/04/20 17:28 EST, [address]  [place] [address]\par  Phone: [phone]   Fax: [phone]\par  _________________________________________________________________\par  \par  Comment\par  Case reported to Tumor Registry.\par  Note: This case was reviewed in intradepartmental QA conference\par  and the diagnosis represents a consensus opinion.\par  \par  Synoptic Summary\par  SURGICAL PATHOLOGY CANCER CASE  SUMMARY\par  \par  MELANOMA OF THE SKIN:  Excision, Re-excision\par  \par  PROCEDURE: Excision\par  SPECIMEN LATERALITY:  Right\par  MACROSCOPIC SATELLITE NODULE: Not identified\par  HISTOLOGIC TYPE\par  INVASIVE MELANOMA:\par  Melanoma, not otherwise classified\par  MELANOMA IN-SITU (anatomic level 1)\par  Melanoma in-situ, not otherwise classified\par  MAXIMUM TUMOR (Breslow) THICKNESS:  0.48 mm\par  ULCERATION:  Not identified\par  MICROSATELLITE:  Not identified\par  PERIPHERAL MARGINS:  Negative for invasive melanoma\par  \par  \par  \par  \par  \par  \par  JACQUELINE, LILO                           3\par  \par  \par  \par  Surgical Final Report\par  \par  \par  \par  \par  Distance of invasive melanoma from closest peripheral margin:15\par  mm from 6 o'clock margin\par  DEEP MARGIN:  Negative for invasive melanoma\par  LYMPHOVASCULAR INVASION:  Not identified\par  NEUROTROPISM:  Not identified\par  TUMOR-INFILTRATING LYMPHOCYTES:  Present, nonbrisk\par  TUMOR REGRESSION:  Not identified\par  REGIONAL LYMPH  NODES:  Uninvolved by tumor cells\par  TOTAL NUMBER OF LYMPH NODES EXAMINED:  1\par  NUMBEROF SENTINEL NODES EXAMINED:  1\par  PATHOLOGIC STAGE\par  TNM DESCRIPTORS:\par  PRIMARY TUMOR (pT):  pT1a\par  REGIONAL LYMPH NODES (pN):  pN0\par  \par  Clinical History\par  See report melanoma 1.8 mm right scalp - slides pending\par  COVID (-)\par  \par  Specimen(s) Submitted\par  1     Right scalp melanoma\par  2     Right neck submandibular lymph node\par  \par  Gross Description\par  1. The specimen is received fresh, labeled "right scalp melanoma\par  stitch marks 12 o'clock" and consists of one fragment of disc\par  shaped skin and subcutaneous tissue with a stitch rich at 12\par  o'clock, measuring 5.5 x 4.5 cm and 0.3 cm in thickness. The skin\par  is tan white and wrinkled. There is a central, slightly depressed\par  lesion with irregular margins, measuring 1.5 x 1.3 cm. The lesion\par  is 1.7 cm from the 12 o'clock margin, 2 cm from the 3 o'clock\par  margin, 1.5 cm from the 6 o'clock margin, 1.7 cm from the 9\par  o'clock margin. The specimen is inked as follows: 12-3 o'clock -\par  red, 3-6 o'clock - green, 6-9 o'clock - yellow, 9-12 o'clock -\par  blue. The lesion is blocked out (1.7 x 1.7 cm) and serially\par  sectioned from superior to inferior.  Representative sections are\par  submitted.\par  \par  Summary of Sections:\par  1A -12-3 o'clock margin -1\par  1B - 3-6 o'clock margin -1\par  1C - 6-9 o'clock margin -1\par  1D - 9-12 o'clock margin -1\par  1E-1F -\par  - Lesion sectioned superior to inferior -2\par  \par  Total blocks - 6\par  \par  \par  \par  \par  \par  \par  \par  URBAN, LILO                           3\par  \par  \par  \par  Surgical Final Report\par  \par  \par  \par  \par  2. The specimen is received fresh, labeled "right neck\par  submandibular lymph node, sentinel lymph node" and consists of\par  one soft tan red lymph node, measuring 1.2 x 0.6 x 0.4 cm. The\par  specimen is serially sectioned, revealing a gray shiny smooth\par  surface. The specimen is submitted entirely. (1 block)\par  \par  Specimen was received and underwent gross examination at [facility], [address] [place] [address],\[place] [address].\par  \par  10/22/20 07:09 rr\par  \par  Perioperative Diagnosis\par  Right scalp melanoma 1.8 mm (see report slides pending)\par  \par   \par  \par   Ordered by: BERNARDO DUMONT IV       Collected/Examined: 21Oct2020 03:00PM       \par  Verification Required       Stage: Final       \par   Performed at: [facility]       Resulted: 04Nov2020 05:28PM       Last Updated: 04Nov2020 05:28PM       Accession: [id number]

## 2024-10-01 NOTE — HISTORY OF PRESENT ILLNESS
[FreeTextEntry1] : 69 y/o F with PMH of multiple BCC who presents for evaluation of newly dx malignant melanoma to scalp, 1.8mm. Pt states it was found on routine dermatological exam. No other testing done yet.\par  \par  Nonsmoker,\par  Nondiabetic\par  Retired\par  Lives alone\par  \par  Interval hx (10/28/20): Pt presents today POD #7 s/p WLE of right scalp melanoma with SLNx and coverage with STSG from right thigh. Denies pain, taking only Tylenol PRN. Completed PO abx. Denies f/c.\par  \par  Interval hx (11/4/20). Patient presents today POD #14 s/p WLE of right scalp melanoma with SLN bx and coverage with STSG from right thigh. Doing well with resolving discomfort. Denies any fever, chills or bleeding. \par  \par  Interval hx (11/17/20).  Patient presents today 4 weeks s/p WLE of right scalp melanoma with SLN bx and coverage with STSG from right thigh. Doing well with no significant complaints. \par  \par  Interval hx (3/9/21).  Patient presents today 5 months s/p WLE of right scalp melanoma with SLN bx and coverage with STSG from right thigh. Doing well. \par  \par  Interval hx (5/3/22).  Patient presents today for f/u s/p WLE of right scalp melanoma with SLN bx and coverage with STSG from right thigh 10/2020. Doing well and sees Dr. Gay regularly. \par  \par  Interval hx (10/4/22).  Patient presents for f/u 2 years s/p WLE of right scalp melanoma with SLN bx and coverage with STSG from right thigh 10/2020. Offers no new complaints. Saw Dr. Penrose in September with no concerning findings.

## 2024-12-04 ENCOUNTER — OUTPATIENT (OUTPATIENT)
Dept: OUTPATIENT SERVICES | Facility: HOSPITAL | Age: 74
LOS: 1 days | End: 2024-12-04
Payer: MEDICARE

## 2024-12-04 ENCOUNTER — APPOINTMENT (OUTPATIENT)
Age: 74
End: 2024-12-04
Payer: MEDICARE

## 2024-12-04 ENCOUNTER — LABORATORY RESULT (OUTPATIENT)
Age: 74
End: 2024-12-04

## 2024-12-04 VITALS
HEIGHT: 62 IN | HEART RATE: 78 BPM | BODY MASS INDEX: 22.26 KG/M2 | TEMPERATURE: 97.4 F | WEIGHT: 121 LBS | SYSTOLIC BLOOD PRESSURE: 136 MMHG | DIASTOLIC BLOOD PRESSURE: 70 MMHG | RESPIRATION RATE: 16 BRPM | OXYGEN SATURATION: 99 %

## 2024-12-04 DIAGNOSIS — Z98.890 OTHER SPECIFIED POSTPROCEDURAL STATES: Chronic | ICD-10-CM

## 2024-12-04 DIAGNOSIS — C43.4 MALIGNANT MELANOMA OF SCALP AND NECK: ICD-10-CM

## 2024-12-04 DIAGNOSIS — D72.10 EOSINOPHILIA, UNSPECIFIED: ICD-10-CM

## 2024-12-04 LAB
HCT VFR BLD CALC: 40 %
HGB BLD-MCNC: 13.3 G/DL
MCHC RBC-ENTMCNC: 33 PG
MCHC RBC-ENTMCNC: 33.3 G/DL
MCV RBC AUTO: 99.3 FL
PLATELET # BLD AUTO: 228 K/UL
PMV BLD: 11 FL
RBC # BLD: 4.03 M/UL
RBC # FLD: 11.8 %
WBC # FLD AUTO: 7.02 K/UL

## 2024-12-04 PROCEDURE — G2211 COMPLEX E/M VISIT ADD ON: CPT

## 2024-12-04 PROCEDURE — 85027 COMPLETE CBC AUTOMATED: CPT

## 2024-12-04 PROCEDURE — 99214 OFFICE O/P EST MOD 30 MIN: CPT

## 2024-12-05 DIAGNOSIS — C43.4 MALIGNANT MELANOMA OF SCALP AND NECK: ICD-10-CM

## 2025-04-09 NOTE — REASON FOR VISIT
Problem: Pain  Goal: Acceptable pain level achieved/maintained at rest using appropriate pain scale for the patient  Outcome: Monitoring/Evaluating progress     Problem: At Risk for Falls  Goal: Patient does not fall  Outcome: Monitoring/Evaluating progress     Problem: At Risk for Injury Due to Fall  Goal: Patient does not fall  Outcome: Monitoring/Evaluating progress      [Follow-Up Visit] : a follow-up [Friend] : friend [FreeTextEntry2] : Melanoma